# Patient Record
Sex: MALE | Race: WHITE | NOT HISPANIC OR LATINO | Employment: OTHER | URBAN - METROPOLITAN AREA
[De-identification: names, ages, dates, MRNs, and addresses within clinical notes are randomized per-mention and may not be internally consistent; named-entity substitution may affect disease eponyms.]

---

## 2017-02-23 LAB
ALBUMIN SERPL-MCNC: 3.9 G/DL (ref 3.6–5.1)
ALBUMIN/GLOB SERPL: 1.3 (CALC) (ref 1–2.5)
ALP SERPL-CCNC: 117 U/L (ref 40–115)
ALT SERPL-CCNC: 19 U/L (ref 9–46)
AST SERPL-CCNC: 17 U/L (ref 10–35)
BASOPHILS # BLD AUTO: 36 CELLS/UL (ref 0–200)
BASOPHILS NFR BLD AUTO: 0.4 %
BILIRUB SERPL-MCNC: 1.5 MG/DL (ref 0.2–1.2)
BUN SERPL-MCNC: 17 MG/DL (ref 7–25)
BUN/CREAT SERPL: ABNORMAL (CALC) (ref 6–22)
CALCIUM SERPL-MCNC: 9.6 MG/DL (ref 8.6–10.3)
CHLORIDE SERPL-SCNC: 104 MMOL/L (ref 98–110)
CHOLEST SERPL-MCNC: 301 MG/DL (ref 125–200)
CHOLEST/HDLC SERPL: 7 (CALC)
CO2 SERPL-SCNC: 27 MMOL/L (ref 20–31)
CREAT SERPL-MCNC: 1.06 MG/DL (ref 0.7–1.18)
EOSINOPHIL # BLD AUTO: 261 CELLS/UL (ref 15–500)
EOSINOPHIL NFR BLD AUTO: 2.9 %
ERYTHROCYTE [DISTWIDTH] IN BLOOD BY AUTOMATED COUNT: 13.8 % (ref 11–15)
GLOBULIN SER CALC-MCNC: 3 G/DL (CALC) (ref 1.9–3.7)
GLUCOSE SERPL-MCNC: 93 MG/DL (ref 65–99)
HCT VFR BLD AUTO: 47.1 % (ref 38.5–50)
HDLC SERPL-MCNC: 43 MG/DL
HGB BLD-MCNC: 15.7 G/DL (ref 13.2–17.1)
LDLC SERPL CALC-MCNC: 207 MG/DL (CALC)
LYMPHOCYTES # BLD AUTO: 2979 CELLS/UL (ref 850–3900)
LYMPHOCYTES NFR BLD AUTO: 33.1 %
MCH RBC QN AUTO: 33 PG (ref 27–33)
MCHC RBC AUTO-ENTMCNC: 33.4 G/DL (ref 32–36)
MCV RBC AUTO: 98.7 FL (ref 80–100)
MONOCYTES # BLD AUTO: 738 CELLS/UL (ref 200–950)
MONOCYTES NFR BLD AUTO: 8.2 %
NEUTROPHILS # BLD AUTO: 4986 CELLS/UL (ref 1500–7800)
NEUTROPHILS NFR BLD AUTO: 55.4 %
NONHDLC SERPL-MCNC: 258 MG/DL (CALC)
PLATELET # BLD AUTO: 314 THOUSAND/UL (ref 140–400)
PMV BLD REES-ECKER: 8.1 FL (ref 7.5–12.5)
POTASSIUM SERPL-SCNC: 4.4 MMOL/L (ref 3.5–5.3)
PROT SERPL-MCNC: 6.9 G/DL (ref 6.1–8.1)
RBC # BLD AUTO: 4.77 MILLION/UL (ref 4.2–5.8)
SL AMB EGFR AFRICAN AMERICAN: 79 ML/MIN/1.73M2
SL AMB EGFR NON AFRICAN AMERICAN: 68 ML/MIN/1.73M2
SODIUM SERPL-SCNC: 141 MMOL/L (ref 135–146)
TRIGL SERPL-MCNC: 257 MG/DL
WBC # BLD AUTO: 9 THOUSAND/UL (ref 3.8–10.8)

## 2017-03-09 LAB
ALBUMIN SERPL-MCNC: 3.9 G/DL (ref 3.6–5.1)
ALBUMIN/GLOB SERPL: 1.4 (CALC) (ref 1–2.5)
ALP SERPL-CCNC: 114 U/L (ref 40–115)
ALT SERPL-CCNC: 19 U/L (ref 9–46)
AST SERPL-CCNC: 17 U/L (ref 10–35)
BILIRUB DIRECT SERPL-MCNC: 0.2 MG/DL
BILIRUB INDIRECT SERPL-MCNC: 0.7 MG/DL (CALC) (ref 0.2–1.2)
BILIRUB SERPL-MCNC: 0.9 MG/DL (ref 0.2–1.2)
GLOBULIN SER CALC-MCNC: 2.7 G/DL (CALC) (ref 1.9–3.7)
PROT SERPL-MCNC: 6.6 G/DL (ref 6.1–8.1)

## 2017-05-19 LAB
ALBUMIN SERPL-MCNC: 3.9 G/DL (ref 3.6–5.1)
ALBUMIN/GLOB SERPL: 1.4 (CALC) (ref 1–2.5)
ALP SERPL-CCNC: 109 U/L (ref 40–115)
ALT SERPL-CCNC: 14 U/L (ref 9–46)
AST SERPL-CCNC: 17 U/L (ref 10–35)
BILIRUB DIRECT SERPL-MCNC: 0.3 MG/DL
BILIRUB INDIRECT SERPL-MCNC: 1.2 MG/DL (CALC) (ref 0.2–1.2)
BILIRUB SERPL-MCNC: 1.5 MG/DL (ref 0.2–1.2)
CHOLEST SERPL-MCNC: 147 MG/DL (ref 125–200)
CHOLEST/HDLC SERPL: 3.3 (CALC)
GLOBULIN SER CALC-MCNC: 2.8 G/DL (CALC) (ref 1.9–3.7)
HDLC SERPL-MCNC: 45 MG/DL
LDLC SERPL CALC-MCNC: 76 MG/DL (CALC)
NONHDLC SERPL-MCNC: 102 MG/DL (CALC)
PROT SERPL-MCNC: 6.7 G/DL (ref 6.1–8.1)
TRIGL SERPL-MCNC: 129 MG/DL

## 2017-05-28 LAB
ALBUMIN SERPL-MCNC: 3.7 G/DL (ref 3.6–5.1)
ALBUMIN/GLOB SERPL: 1.2 (CALC) (ref 1–2.5)
ALP SERPL-CCNC: 105 U/L (ref 40–115)
ALT SERPL-CCNC: 15 U/L (ref 9–46)
AST SERPL-CCNC: 15 U/L (ref 10–35)
BILIRUB DIRECT SERPL-MCNC: 0.2 MG/DL
BILIRUB INDIRECT SERPL-MCNC: 0.6 MG/DL (CALC) (ref 0.2–1.2)
BILIRUB SERPL-MCNC: 0.8 MG/DL (ref 0.2–1.2)
GLOBULIN SER CALC-MCNC: 3 G/DL (CALC) (ref 1.9–3.7)
PROT SERPL-MCNC: 6.7 G/DL (ref 6.1–8.1)

## 2017-06-04 LAB
BUN SERPL-MCNC: 14 MG/DL (ref 7–25)
BUN/CREAT SERPL: NORMAL (CALC) (ref 6–22)
CALCIUM SERPL-MCNC: 9.2 MG/DL (ref 8.6–10.3)
CHLORIDE SERPL-SCNC: 106 MMOL/L (ref 98–110)
CO2 SERPL-SCNC: 30 MMOL/L (ref 20–31)
CREAT SERPL-MCNC: 1 MG/DL (ref 0.7–1.18)
GLUCOSE SERPL-MCNC: 89 MG/DL (ref 65–99)
POTASSIUM SERPL-SCNC: 5 MMOL/L (ref 3.5–5.3)
SL AMB EGFR AFRICAN AMERICAN: 85 ML/MIN/1.73M2
SL AMB EGFR NON AFRICAN AMERICAN: 73 ML/MIN/1.73M2
SODIUM SERPL-SCNC: 141 MMOL/L (ref 135–146)

## 2017-07-31 ENCOUNTER — ALLSCRIPTS OFFICE VISIT (OUTPATIENT)
Dept: OTHER | Facility: OTHER | Age: 77
End: 2017-07-31

## 2017-07-31 LAB
BILIRUB UR QL STRIP: NORMAL
CLARITY UR: NORMAL
COLOR UR: YELLOW
GLUCOSE (HISTORICAL): NORMAL
HGB UR QL STRIP.AUTO: NORMAL
KETONES UR STRIP-MCNC: NORMAL MG/DL
LEUKOCYTE ESTERASE UR QL STRIP: NORMAL
NITRITE UR QL STRIP: NORMAL
PH UR STRIP.AUTO: 5.5 [PH]
PROT UR STRIP-MCNC: NORMAL MG/DL
SP GR UR STRIP.AUTO: 1.02
UROBILINOGEN UR QL STRIP.AUTO: 0.2

## 2017-09-14 LAB
ALBUMIN SERPL-MCNC: 3.9 G/DL (ref 3.6–5.1)
ALBUMIN/GLOB SERPL: 1.4 (CALC) (ref 1–2.5)
ALP SERPL-CCNC: 105 U/L (ref 40–115)
ALT SERPL-CCNC: 15 U/L (ref 9–46)
AST SERPL-CCNC: 16 U/L (ref 10–35)
BILIRUB DIRECT SERPL-MCNC: 0.2 MG/DL
BILIRUB INDIRECT SERPL-MCNC: 1 MG/DL (CALC) (ref 0.2–1.2)
BILIRUB SERPL-MCNC: 1.2 MG/DL (ref 0.2–1.2)
GLOBULIN SER CALC-MCNC: 2.7 G/DL (CALC) (ref 1.9–3.7)
PROT SERPL-MCNC: 6.6 G/DL (ref 6.1–8.1)

## 2018-01-10 LAB
ALT SERPL-CCNC: 18 U/L (ref 9–46)
AST SERPL-CCNC: 17 U/L (ref 10–35)
CHOLEST SERPL-MCNC: 157 MG/DL
CHOLEST/HDLC SERPL: 3.8 (CALC)
HDLC SERPL-MCNC: 41 MG/DL
LDLC SERPL CALC-MCNC: 90 MG/DL (CALC)
NONHDLC SERPL-MCNC: 116 MG/DL (CALC)
TRIGL SERPL-MCNC: 154 MG/DL

## 2018-01-22 VITALS
SYSTOLIC BLOOD PRESSURE: 120 MMHG | DIASTOLIC BLOOD PRESSURE: 72 MMHG | WEIGHT: 200 LBS | HEIGHT: 70 IN | BODY MASS INDEX: 28.63 KG/M2

## 2018-02-01 RX ORDER — BIMATOPROST 0.01 %
DROPS OPHTHALMIC (EYE)
Refills: 5 | COMMUNITY
Start: 2018-01-02 | End: 2019-11-27 | Stop reason: SDUPTHER

## 2018-02-01 RX ORDER — ROSUVASTATIN CALCIUM 10 MG/1
10 TABLET, COATED ORAL DAILY
Refills: 1 | COMMUNITY
Start: 2018-01-02 | End: 2018-09-24 | Stop reason: SDUPTHER

## 2018-02-01 RX ORDER — SIMVASTATIN 40 MG
TABLET ORAL
COMMUNITY
End: 2018-09-24

## 2018-02-01 RX ORDER — ASPIRIN 81 MG/1
TABLET ORAL
COMMUNITY
End: 2019-11-27 | Stop reason: SDUPTHER

## 2018-02-05 ENCOUNTER — OFFICE VISIT (OUTPATIENT)
Dept: UROLOGY | Facility: MEDICAL CENTER | Age: 78
End: 2018-02-05
Payer: COMMERCIAL

## 2018-02-05 VITALS
HEIGHT: 70 IN | BODY MASS INDEX: 29.78 KG/M2 | DIASTOLIC BLOOD PRESSURE: 74 MMHG | SYSTOLIC BLOOD PRESSURE: 132 MMHG | WEIGHT: 208 LBS

## 2018-02-05 DIAGNOSIS — R97.20 ELEVATED PSA: ICD-10-CM

## 2018-02-05 DIAGNOSIS — N40.1 BPH WITH OBSTRUCTION/LOWER URINARY TRACT SYMPTOMS: Primary | ICD-10-CM

## 2018-02-05 DIAGNOSIS — N13.8 BPH WITH OBSTRUCTION/LOWER URINARY TRACT SYMPTOMS: Primary | ICD-10-CM

## 2018-02-05 LAB
SL AMB  POCT GLUCOSE, UA: NEGATIVE
SL AMB LEUKOCYTE ESTERASE,UA: NEGATIVE
SL AMB POCT BILIRUBIN,UA: NEGATIVE
SL AMB POCT BLOOD,UA: NORMAL
SL AMB POCT CLARITY,UA: CLEAR
SL AMB POCT COLOR,UA: YELLOW
SL AMB POCT KETONES,UA: NEGATIVE
SL AMB POCT NITRITE,UA: NEGATIVE
SL AMB POCT PH,UA: 5.5
SL AMB POCT SPECIFIC GRAVITY,UA: 1.02
SL AMB POCT URINE PROTEIN: NEGATIVE
SL AMB POCT UROBILINOGEN: 0.2

## 2018-02-05 PROCEDURE — 99214 OFFICE O/P EST MOD 30 MIN: CPT | Performed by: UROLOGY

## 2018-02-05 PROCEDURE — 81003 URINALYSIS AUTO W/O SCOPE: CPT | Performed by: UROLOGY

## 2018-02-05 NOTE — ASSESSMENT & PLAN NOTE
Patient's PSA has been relatively stable over time  In fact it was higher in 2011 when he had his  prostate biopsy  The risk of prostate cancer was discussed We discussed options for further evaluation including repeat prostate biopsy and prostate MRI  At the conclusion of our discussion the patient was only in favor of continued observation    We will recheck his PSA in 1 year

## 2018-02-05 NOTE — PROGRESS NOTES
IPSS Questionnaire (AUA-7): Over the past month    1)  How often have you had a sensation of not emptying your bladder completely after you finish urinating? 2 - Less than half the time   2)  How often have you had to urinate again less than two hours after you finished urinating? 1 - Less than 1 time in 5   3)  How often have you found you stopped and started again several times when you urinated? 1 - Less than 1 time in 5   4) How difficult have you found it to postpone urination? 1 - Less than 1 time in 5   5) How often have you had a weak urinary stream?  1 - Less than 1 time in 5   6) How often have you had to push or strain to begin urination? 1 - Less than 1 time in 5   7) How many times did you most typically get up to urinate from the time you went to bed until the time you got up in the morning?   1 - 1 time   Total Score:  8

## 2018-02-05 NOTE — LETTER
February 5, 2018     Ying Fernandez MD  [address] [address]    Patient: Haylee Carlson   YOB: 1940   Date of Visit: 2/5/2018       Dear Dr Akanksha Yi: Thank you for referring Ediscornel Medinamadi to me for evaluation  Below are my notes for this consultation  If you have questions, please do not hesitate to call me  I look forward to following your patient along with you  Sincerely,        Kami Bates MD        CC: No Recipients  Kami Bates MD  2/5/2018  5:55 PM  Sign at close encounter  Assessment/Plan:    BPH with obstruction/lower urinary tract symptoms  Patient is satisfied with his voiding pattern    Elevated PSA  Patient's PSA has been relatively stable over time  In fact it was higher in 2011 when he had his  prostate biopsy  The risk of prostate cancer was discussed We discussed options for further evaluation including repeat prostate biopsy and prostate MRI  At the conclusion of our discussion the patient was only in favor of continued observation  We will recheck his PSA in 1 year       Diagnoses and all orders for this visit:    BPH with obstruction/lower urinary tract symptoms  -     POCT urine dip auto non-scope    Elevated PSA  -     PSA, total and free; Future          Subjective:      Patient ID: Haylee Carlson is a 78 y o  male  31-year-old male followed for elevated PSA and lower urinary tract symptoms secondary to BPH  He notes he is voiding adequately  He is satisfied with his voiding pattern  He denies incontinence, gross hematuria, or history of urinary infection  His stream is adequate  He gets up once a night to urinate  His AUA symptom score is 8  He underwent prostate biopsy in 2011          The following portions of the patient's history were reviewed and updated as appropriate: allergies, current medications, past family history, past medical history, past social history, past surgical history and problem list     Review of Systems   Constitutional: Negative for chills, diaphoresis, fatigue and fever  HENT: Negative  Eyes: Negative  Respiratory: Negative  Cardiovascular: Negative  Endocrine: Negative  Musculoskeletal: Negative  Skin: Negative  Allergic/Immunologic: Negative  Neurological: Negative  Hematological: Negative  Psychiatric/Behavioral: Negative  Objective:     Physical Exam   Constitutional: He is oriented to person, place, and time  He appears well-developed and well-nourished  HENT:   Head: Normocephalic and atraumatic  Eyes: Conjunctivae are normal    Neck: Neck supple  Cardiovascular: Normal rate  Pulmonary/Chest: Effort normal    Abdominal: Soft  Bowel sounds are normal  He exhibits no distension and no mass  There is no tenderness  There is no rebound, no guarding and no CVA tenderness  Hernia confirmed negative in the right inguinal area and confirmed negative in the left inguinal area  No hernia   Genitourinary: Rectum normal, testes normal and penis normal  Prostate is enlarged  Prostate is not tender  Right testis shows no mass  Left testis shows no mass  No phimosis or hypospadias  Genitourinary Comments: Normal phallus, testes descended and palpably normal   Rectal examination:  Normal tone no mass prostate 2 and half times enlarged and palpably benign  Musculoskeletal: He exhibits no edema  Neurological: He is alert and oriented to person, place, and time  Skin: Skin is warm and dry  Psychiatric: He has a normal mood and affect   His behavior is normal  Judgment and thought content normal

## 2018-02-05 NOTE — PATIENT INSTRUCTIONS
Prostate Specific Antigen   AMBULATORY CARE:   A prostate specific antigen (PSA) test  is a blood test used to screen men for prostate cancer  A PSA test is also used to monitor how well prostate cancer treatment is working  Other test that may be done with a PSA test:  A digital rectal exam is usually performed with a PSA test  Your healthcare provider will insert a gloved finger into your rectum to feel if your prostate is large, firm, or has lumps  Who may need a PSA test:  Some experts recommend a PSA test for men ages 48 to 79  They also recommend testing men with a high risk for prostate cancer at age 36 or 39  Risk factors may include being  or having a brother or father with prostate cancer  Other experts may not recommend PSA testing  Your healthcare provider can help you decide if you need a PSA test    What the results of a PSA test mean:  Most healthy men have a PSA level less than 4 ng/mL  If your PSA level is higher than 4 ng/mL you may need more tests  Examples include blood or urine tests, an ultrasound, MRI, CT scan, or a prostate biopsy  Ask your healthcare provider for more information on these tests  Other causes of a high PSA level:  A high PSA level does not always mean you have prostate cancer  Certain conditions or procedures can increase PSA levels  Examples include:  · Older age    · Procedures such as a prostate biopsy or a cystoscopy    · An enlarged prostate    · Recent sexual activity    · A prostate infection    · Certain exercises that put pressure on the prostate such as bicycling    · Medicine such as testosterone  © 2017 [employer] Information is for End User's use only and may not be sold, redistributed or otherwise used for commercial purposes  All illustrations and images included in CareNotes® are the copyrighted property of [employer]  or Timothy Frausto  The above information is an  only   It is not intended as medical advice for individual conditions or treatments  Talk to your doctor, nurse or pharmacist before following any medical regimen to see if it is safe and effective for you

## 2018-02-05 NOTE — PROGRESS NOTES
Assessment/Plan:    BPH with obstruction/lower urinary tract symptoms  Patient is satisfied with his voiding pattern    Elevated PSA  Patient's PSA has been relatively stable over time  In fact it was higher in 2011 when he had his  prostate biopsy  The risk of prostate cancer was discussed We discussed options for further evaluation including repeat prostate biopsy and prostate MRI  At the conclusion of our discussion the patient was only in favor of continued observation  We will recheck his PSA in 1 year       Diagnoses and all orders for this visit:    BPH with obstruction/lower urinary tract symptoms  -     POCT urine dip auto non-scope    Elevated PSA  -     PSA, total and free; Future          Subjective:      Patient ID: Janina Ribeiro is a 78 y o  male  79-year-old male followed for elevated PSA and lower urinary tract symptoms secondary to BPH  He notes he is voiding adequately  He is satisfied with his voiding pattern  He denies incontinence, gross hematuria, or history of urinary infection  His stream is adequate  He gets up once a night to urinate  His AUA symptom score is 8  He underwent prostate biopsy in 2011  The following portions of the patient's history were reviewed and updated as appropriate: allergies, current medications, past family history, past medical history, past social history, past surgical history and problem list     Review of Systems   Constitutional: Negative for chills, diaphoresis, fatigue and fever  HENT: Negative  Eyes: Negative  Respiratory: Negative  Cardiovascular: Negative  Endocrine: Negative  Musculoskeletal: Negative  Skin: Negative  Allergic/Immunologic: Negative  Neurological: Negative  Hematological: Negative  Psychiatric/Behavioral: Negative  Objective:     Physical Exam   Constitutional: He is oriented to person, place, and time  He appears well-developed and well-nourished     HENT:   Head: Normocephalic and atraumatic  Eyes: Conjunctivae are normal    Neck: Neck supple  Cardiovascular: Normal rate  Pulmonary/Chest: Effort normal    Abdominal: Soft  Bowel sounds are normal  He exhibits no distension and no mass  There is no tenderness  There is no rebound, no guarding and no CVA tenderness  Hernia confirmed negative in the right inguinal area and confirmed negative in the left inguinal area  No hernia   Genitourinary: Rectum normal, testes normal and penis normal  Prostate is enlarged  Prostate is not tender  Right testis shows no mass  Left testis shows no mass  No phimosis or hypospadias  Genitourinary Comments: Normal phallus, testes descended and palpably normal   Rectal examination:  Normal tone no mass prostate 2 and half times enlarged and palpably benign  Musculoskeletal: He exhibits no edema  Neurological: He is alert and oriented to person, place, and time  Skin: Skin is warm and dry  Psychiatric: He has a normal mood and affect   His behavior is normal  Judgment and thought content normal

## 2018-05-09 LAB
ALBUMIN SERPL-MCNC: 4 G/DL (ref 3.6–5.1)
ALBUMIN/GLOB SERPL: 1.4 (CALC) (ref 1–2.5)
ALP SERPL-CCNC: 81 U/L (ref 40–115)
ALT SERPL-CCNC: 16 U/L (ref 9–46)
AST SERPL-CCNC: 18 U/L (ref 10–35)
BASOPHILS # BLD AUTO: 57 CELLS/UL (ref 0–200)
BASOPHILS NFR BLD AUTO: 0.8 %
BILIRUB SERPL-MCNC: 1.4 MG/DL (ref 0.2–1.2)
BUN SERPL-MCNC: 15 MG/DL (ref 7–25)
BUN/CREAT SERPL: ABNORMAL (CALC) (ref 6–22)
CALCIUM SERPL-MCNC: 9.7 MG/DL (ref 8.6–10.3)
CHLORIDE SERPL-SCNC: 107 MMOL/L (ref 98–110)
CO2 SERPL-SCNC: 28 MMOL/L (ref 20–31)
CREAT SERPL-MCNC: 0.98 MG/DL (ref 0.7–1.18)
EOSINOPHIL # BLD AUTO: 348 CELLS/UL (ref 15–500)
EOSINOPHIL NFR BLD AUTO: 4.9 %
ERYTHROCYTE [DISTWIDTH] IN BLOOD BY AUTOMATED COUNT: 12.6 % (ref 11–15)
GLOBULIN SER CALC-MCNC: 2.8 G/DL (CALC) (ref 1.9–3.7)
GLUCOSE SERPL-MCNC: 95 MG/DL (ref 65–99)
HCT VFR BLD AUTO: 45.3 % (ref 38.5–50)
HGB BLD-MCNC: 15.2 G/DL (ref 13.2–17.1)
LYMPHOCYTES # BLD AUTO: 2166 CELLS/UL (ref 850–3900)
LYMPHOCYTES NFR BLD AUTO: 30.5 %
MCH RBC QN AUTO: 32.5 PG (ref 27–33)
MCHC RBC AUTO-ENTMCNC: 33.6 G/DL (ref 32–36)
MCV RBC AUTO: 97 FL (ref 80–100)
MONOCYTES # BLD AUTO: 582 CELLS/UL (ref 200–950)
MONOCYTES NFR BLD AUTO: 8.2 %
NEUTROPHILS # BLD AUTO: 3948 CELLS/UL (ref 1500–7800)
NEUTROPHILS NFR BLD AUTO: 55.6 %
PLATELET # BLD AUTO: 282 THOUSAND/UL (ref 140–400)
PMV BLD REES-ECKER: 10.2 FL (ref 7.5–12.5)
POTASSIUM SERPL-SCNC: 4.3 MMOL/L (ref 3.5–5.3)
PROT SERPL-MCNC: 6.8 G/DL (ref 6.1–8.1)
RBC # BLD AUTO: 4.67 MILLION/UL (ref 4.2–5.8)
SL AMB EGFR AFRICAN AMERICAN: 86 ML/MIN/1.73M2
SL AMB EGFR NON AFRICAN AMERICAN: 75 ML/MIN/1.73M2
SODIUM SERPL-SCNC: 140 MMOL/L (ref 135–146)
WBC # BLD AUTO: 7.1 THOUSAND/UL (ref 3.8–10.8)

## 2018-09-24 DIAGNOSIS — E78.2 MIXED HYPERLIPIDEMIA: Primary | ICD-10-CM

## 2018-09-24 RX ORDER — ROSUVASTATIN CALCIUM 10 MG/1
10 TABLET, COATED ORAL DAILY
Qty: 90 TABLET | Refills: 3 | Status: SHIPPED | OUTPATIENT
Start: 2018-09-24 | End: 2019-09-16 | Stop reason: SDUPTHER

## 2018-10-09 LAB
ALBUMIN SERPL-MCNC: 3.9 G/DL (ref 3.6–5.1)
ALBUMIN/GLOB SERPL: 1.4 (CALC) (ref 1–2.5)
ALP SERPL-CCNC: 72 U/L (ref 40–115)
ALT SERPL-CCNC: 14 U/L (ref 9–46)
AST SERPL-CCNC: 14 U/L (ref 10–35)
BILIRUB DIRECT SERPL-MCNC: 0.2 MG/DL
BILIRUB INDIRECT SERPL-MCNC: 1 MG/DL (CALC) (ref 0.2–1.2)
BILIRUB SERPL-MCNC: 1.2 MG/DL (ref 0.2–1.2)
CHOLEST SERPL-MCNC: 153 MG/DL
CHOLEST/HDLC SERPL: 3.5 (CALC)
GLOBULIN SER CALC-MCNC: 2.8 G/DL (CALC) (ref 1.9–3.7)
HDLC SERPL-MCNC: 44 MG/DL
LDLC SERPL CALC-MCNC: 86 MG/DL (CALC)
NONHDLC SERPL-MCNC: 109 MG/DL (CALC)
PROT SERPL-MCNC: 6.7 G/DL (ref 6.1–8.1)
TRIGL SERPL-MCNC: 132 MG/DL

## 2018-10-16 ENCOUNTER — OFFICE VISIT (OUTPATIENT)
Dept: FAMILY MEDICINE CLINIC | Facility: CLINIC | Age: 78
End: 2018-10-16
Payer: COMMERCIAL

## 2018-10-16 VITALS
WEIGHT: 211.2 LBS | SYSTOLIC BLOOD PRESSURE: 118 MMHG | HEIGHT: 69 IN | HEART RATE: 86 BPM | OXYGEN SATURATION: 95 % | RESPIRATION RATE: 20 BRPM | TEMPERATURE: 96.9 F | BODY MASS INDEX: 31.28 KG/M2 | DIASTOLIC BLOOD PRESSURE: 60 MMHG

## 2018-10-16 DIAGNOSIS — D75.89 MACROCYTOSIS: ICD-10-CM

## 2018-10-16 DIAGNOSIS — R79.89 ABNORMAL LFTS (LIVER FUNCTION TESTS): ICD-10-CM

## 2018-10-16 DIAGNOSIS — R03.0 BLOOD PRESSURE ELEVATED WITHOUT HISTORY OF HTN: ICD-10-CM

## 2018-10-16 DIAGNOSIS — N13.8 BPH WITH OBSTRUCTION/LOWER URINARY TRACT SYMPTOMS: ICD-10-CM

## 2018-10-16 DIAGNOSIS — R97.20 ELEVATED PSA: ICD-10-CM

## 2018-10-16 DIAGNOSIS — E78.00 PURE HYPERCHOLESTEROLEMIA: Primary | ICD-10-CM

## 2018-10-16 DIAGNOSIS — Z28.21 IMMUNIZATION NOT CARRIED OUT BECAUSE OF PATIENT REFUSAL: ICD-10-CM

## 2018-10-16 DIAGNOSIS — N40.1 BPH WITH OBSTRUCTION/LOWER URINARY TRACT SYMPTOMS: ICD-10-CM

## 2018-10-16 PROCEDURE — 99214 OFFICE O/P EST MOD 30 MIN: CPT | Performed by: FAMILY MEDICINE

## 2018-10-16 PROCEDURE — 3008F BODY MASS INDEX DOCD: CPT | Performed by: FAMILY MEDICINE

## 2018-10-16 PROCEDURE — 1160F RVW MEDS BY RX/DR IN RCRD: CPT | Performed by: FAMILY MEDICINE

## 2018-10-16 NOTE — PATIENT INSTRUCTIONS
Recommend colonoscopy/ patient stated will do it next  Time   to follow up with his Test result results

## 2018-10-18 NOTE — PROGRESS NOTES
Assessment/Plan:      Diagnoses and all orders for this visit:    Pure hypercholesterolemia  Comments:  Controlled  To follow with low-fat diet    Abnormal LFTs (liver function tests)  Comments:   corrected    Blood pressure elevated without history of HTN  Comments:   today blood pressure is normal    Macrocytosis  -     CBC and differential; Future    BPH with obstruction/lower urinary tract symptoms  Comments: To follow with Urology    Elevated PSA  Comments: To follow with Urology    Immunization not carried out because of patient refusal  Comments:  Flu shot, Prevnar, and vaccine for shingles    Other orders  -     Brimonidine Tartrate-Timolol (COMBIGAN OP); Apply to eye          Subjective:     Patient ID: Ricky Burch is a 70 y o  male  Patient is here for follow-up on his chronic medical problems  Hyperlipidemia  Denied side effect with the statin  Admit to regular fat intake  Denied chest pain or skin lesion secondary to hyperlipidemia  Patient see Ophthalmology every 6 months  Macrocytosis  Patient denied drinking alcohol  Not following with Dr Vero Silverman  Abnormal liver function test   Denied abdominal pain  Obesity  Patient does not watch his diet  Or he does exercise  Denied fatigue  Enlarged prostate  Patient denied problem with urination  He does follow with Urology    Test results  Labs done on 10/ 08/2018 discussed with patient        Review of Systems   Constitutional: Negative for activity change, appetite change, chills, fatigue, fever and unexpected weight change  HENT: Negative for congestion, ear pain, sinus pressure, sore throat, trouble swallowing and voice change  Eyes: Negative for visual disturbance  Respiratory: Negative for cough, chest tightness, shortness of breath and wheezing  Cardiovascular: Negative for chest pain, palpitations and leg swelling  Gastrointestinal: Negative for abdominal pain, blood in stool, constipation, diarrhea, nausea and vomiting  Endocrine: Negative for polydipsia and polyphagia  Genitourinary: Negative for dysuria, frequency, hematuria and urgency  Musculoskeletal: Negative for arthralgias, back pain, gait problem, joint swelling, myalgias and neck pain  Skin: Negative for rash  Neurological: Negative for dizziness, tremors, seizures, syncope, weakness, light-headedness and headaches  Hematological: Negative for adenopathy  Does not bruise/bleed easily  Psychiatric/Behavioral: Negative for behavioral problems, confusion, dysphoric mood and sleep disturbance  The patient is not nervous/anxious  Objective:     Physical Exam   Constitutional: He is oriented to person, place, and time  He appears well-developed and well-nourished  No distress  HENT:   Head: Normocephalic  Eyes: No scleral icterus  Neck: Neck supple  No JVD present  No thyromegaly present  Cardiovascular: Normal rate and regular rhythm  No murmur heard  Pulses:       Carotid pulses are 3+ on the right side, and 3+ on the left side  Dorsalis pedis pulses are 3+ on the right side, and 3+ on the left side  Pulmonary/Chest: Effort normal and breath sounds normal    Abdominal: Soft  Bowel sounds are normal  He exhibits no distension and no mass  There is no tenderness  There is no rebound and no guarding  Musculoskeletal: He exhibits no edema or tenderness  Lymphadenopathy:     He has no cervical adenopathy  Neurological: He is alert and oriented to person, place, and time  No cranial nerve deficit  He exhibits normal muscle tone  Skin: No rash noted  No erythema  No pallor  Psychiatric: He has a normal mood and affect   His behavior is normal  Judgment and thought content normal

## 2018-10-23 ENCOUNTER — CLINICAL SUPPORT (OUTPATIENT)
Dept: FAMILY MEDICINE CLINIC | Facility: CLINIC | Age: 78
End: 2018-10-23
Payer: COMMERCIAL

## 2018-10-23 VITALS — TEMPERATURE: 97 F

## 2018-10-23 DIAGNOSIS — Z23 NEED FOR IMMUNIZATION AGAINST INFLUENZA: Primary | ICD-10-CM

## 2018-10-23 PROCEDURE — G0008 ADMIN INFLUENZA VIRUS VAC: HCPCS | Performed by: FAMILY MEDICINE

## 2018-10-23 PROCEDURE — 90662 IIV NO PRSV INCREASED AG IM: CPT | Performed by: FAMILY MEDICINE

## 2019-02-05 LAB
BASOPHILS # BLD AUTO: 60 CELLS/UL (ref 0–200)
BASOPHILS NFR BLD AUTO: 0.7 %
EOSINOPHIL # BLD AUTO: 391 CELLS/UL (ref 15–500)
EOSINOPHIL NFR BLD AUTO: 4.6 %
ERYTHROCYTE [DISTWIDTH] IN BLOOD BY AUTOMATED COUNT: 12.7 % (ref 11–15)
HCT VFR BLD AUTO: 44.9 % (ref 38.5–50)
HGB BLD-MCNC: 15.3 G/DL (ref 13.2–17.1)
LYMPHOCYTES # BLD AUTO: 2278 CELLS/UL (ref 850–3900)
LYMPHOCYTES NFR BLD AUTO: 26.8 %
MCH RBC QN AUTO: 33.1 PG (ref 27–33)
MCHC RBC AUTO-ENTMCNC: 34.1 G/DL (ref 32–36)
MCV RBC AUTO: 97.2 FL (ref 80–100)
MONOCYTES # BLD AUTO: 740 CELLS/UL (ref 200–950)
MONOCYTES NFR BLD AUTO: 8.7 %
NEUTROPHILS # BLD AUTO: 5032 CELLS/UL (ref 1500–7800)
NEUTROPHILS NFR BLD AUTO: 59.2 %
PLATELET # BLD AUTO: 294 THOUSAND/UL (ref 140–400)
PMV BLD REES-ECKER: 10.1 FL (ref 7.5–12.5)
RBC # BLD AUTO: 4.62 MILLION/UL (ref 4.2–5.8)
WBC # BLD AUTO: 8.5 THOUSAND/UL (ref 3.8–10.8)

## 2019-02-12 ENCOUNTER — OFFICE VISIT (OUTPATIENT)
Dept: UROLOGY | Facility: MEDICAL CENTER | Age: 79
End: 2019-02-12
Payer: COMMERCIAL

## 2019-02-12 VITALS
HEART RATE: 62 BPM | BODY MASS INDEX: 30.51 KG/M2 | SYSTOLIC BLOOD PRESSURE: 138 MMHG | DIASTOLIC BLOOD PRESSURE: 70 MMHG | HEIGHT: 69 IN | WEIGHT: 206 LBS

## 2019-02-12 DIAGNOSIS — N13.8 BPH WITH OBSTRUCTION/LOWER URINARY TRACT SYMPTOMS: ICD-10-CM

## 2019-02-12 DIAGNOSIS — N52.03 COMBINED ARTERIAL INSUFFICIENCY AND CORPORO-VENOUS OCCLUSIVE ERECTILE DYSFUNCTION: ICD-10-CM

## 2019-02-12 DIAGNOSIS — R31.29 MICROSCOPIC HEMATURIA: Primary | ICD-10-CM

## 2019-02-12 DIAGNOSIS — N40.1 BPH WITH OBSTRUCTION/LOWER URINARY TRACT SYMPTOMS: ICD-10-CM

## 2019-02-12 DIAGNOSIS — R97.20 ELEVATED PSA: ICD-10-CM

## 2019-02-12 LAB
SL AMB  POCT GLUCOSE, UA: ABNORMAL
SL AMB LEUKOCYTE ESTERASE,UA: ABNORMAL
SL AMB POCT BILIRUBIN,UA: ABNORMAL
SL AMB POCT BLOOD,UA: ABNORMAL
SL AMB POCT CLARITY,UA: CLEAR
SL AMB POCT COLOR,UA: YELLOW
SL AMB POCT KETONES,UA: ABNORMAL
SL AMB POCT NITRITE,UA: ABNORMAL
SL AMB POCT PH,UA: 6
SL AMB POCT SPECIFIC GRAVITY,UA: <=1.005
SL AMB POCT URINE PROTEIN: ABNORMAL
SL AMB POCT UROBILINOGEN: 0.2

## 2019-02-12 PROCEDURE — 81003 URINALYSIS AUTO W/O SCOPE: CPT | Performed by: UROLOGY

## 2019-02-12 PROCEDURE — 99214 OFFICE O/P EST MOD 30 MIN: CPT | Performed by: UROLOGY

## 2019-02-12 RX ORDER — SILDENAFIL 100 MG/1
100 TABLET, FILM COATED ORAL DAILY PRN
Qty: 30 TABLET | Refills: 1 | Status: SHIPPED | OUTPATIENT
Start: 2019-02-12

## 2019-02-12 NOTE — ASSESSMENT & PLAN NOTE
AUA symptom score is 16  He is satisfied with his voiding pattern  We will continue to follow with watchful waiting

## 2019-02-12 NOTE — LETTER
February 12, 2019     Apurva Patel MD  [address]    Patient: Sarai Stanley   YOB: 1940   Date of Visit: 2/12/2019       Dear Dr Farida Pinedo: Thank you for referring Luca Paige to me for evaluation  Below are my notes for this consultation  If you have questions, please do not hesitate to call me  I look forward to following your patient along with you  Sincerely,        Sonny Camacho MD        CC: No Recipients  Sonny Camacho MD  2/12/2019  2:56 PM  Sign at close encounter  Assessment/Plan:    Elevated PSA  PSA was 5 7 on February 5, 2019  Free PSA is 19%  Last year his PSA was 7 8  His digital rectal examination is benign in nature  He has a long history of PSA elevation and did have a biopsy in the past   Options were discussed including MRI and we will continue to follow  We will plan to recheck a PSA in 1 year    BPH with obstruction/lower urinary tract symptoms  AUA symptom score is 16  He is satisfied with his voiding pattern  We will continue to follow with watchful waiting  Microscopic hematuria  Trace blood is noted on dipstick  We will continue to follow and plan to recheck his urinalysis in 1 year  Diagnoses and all orders for this visit:    Microscopic hematuria  -     POCT urine dip auto non-scope  -     Urinalysis with microscopic; Future    BPH with obstruction/lower urinary tract symptoms  -     PSA, total and free; Future    Elevated PSA  -     PSA, total and free; Future    Combined arterial insufficiency and corporo-venous occlusive erectile dysfunction  -     sildenafil (VIAGRA) 100 mg tablet; Take 1 tablet (100 mg total) by mouth daily as needed for erectile dysfunction          Subjective:      Patient ID: Sarai Stanley is a 70 y o  male  Benign Prostatic Hypertrophy   This is a chronic problem  The current episode started more than 1 year ago  The problem is unchanged  Irritative symptoms include nocturia  Irritative symptoms do not include frequency or urgency  Obstructive symptoms include a slower stream  Obstructive symptoms do not include dribbling, incomplete emptying, an intermittent stream, straining or a weak stream  Pertinent negatives include no chills, dysuria, genital pain, hematuria, hesitancy, nausea or vomiting  AUA score is 8-19  His sexual activity is non-contributory to the current illness  Nothing aggravates the symptoms  Past treatments include nothing (He is satisfied with his voiding pattern  )  Erectile Dysfunction   This is a new problem  The current episode started more than 1 month ago  The problem has been gradually worsening since onset  The nature of his difficulty is maintaining erection  Irritative symptoms include nocturia  Irritative symptoms do not include frequency or urgency  Obstructive symptoms include a slower stream  Obstructive symptoms do not include dribbling, incomplete emptying, an intermittent stream, straining or a weak stream  Pertinent negatives include no chills, dysuria, genital pain, hematuria or hesitancy  Nothing aggravates the symptoms  Past treatments include nothing  The following portions of the patient's history were reviewed and updated as appropriate: allergies, current medications, past family history, past medical history, past social history, past surgical history and problem list     Review of Systems   Constitutional: Negative for chills, diaphoresis, fatigue and fever  HENT: Negative  Eyes: Negative  Respiratory: Negative  Cardiovascular: Negative  Gastrointestinal: Negative  Negative for nausea and vomiting  Endocrine: Negative  Genitourinary: Positive for nocturia  Negative for dysuria, frequency, hematuria, hesitancy, incomplete emptying and urgency  See HPI   Musculoskeletal: Negative  Skin: Negative  Allergic/Immunologic: Negative  Neurological: Negative  Hematological: Negative  Psychiatric/Behavioral: Negative  AUA SYMPTOM SCORE      Most Recent Value   AUA SYMPTOM SCORE   How often have you had a sensation of not emptying your bladder completely after you finished urinating? 3   How often have you had to urinate again less than two hours after you finished urinating? 3   How often have you found you stopped and started again several times when you urinate? 2   How often have you found it difficult to postpone urination? 2   How often have you had a weak urinary stream?  2   How often have you had to push or strain to begin urination? 2   How many times did you most typically get up to urinate from the time you went to bed at night until the time you got up in the morning? 2   Quality of Life: If you were to spend the rest of your life with your urinary condition just the way it is now, how would you feel about that?  2   AUA SYMPTOM SCORE  16        Objective:      /70 (BP Location: Left arm, Patient Position: Sitting, Cuff Size: Adult)   Pulse 62   Ht 5' 9" (1 753 m)   Wt 93 4 kg (206 lb)   BMI 30 42 kg/m²           Physical Exam   Constitutional: He is oriented to person, place, and time  He appears well-developed and well-nourished  HENT:   Head: Normocephalic and atraumatic  Eyes: Conjunctivae are normal    Neck: Neck supple  Cardiovascular: Normal rate  Pulmonary/Chest: Effort normal    Abdominal: Soft  Bowel sounds are normal  He exhibits no distension and no mass  There is no tenderness  There is no rebound, no guarding and no CVA tenderness  Genitourinary: Rectum normal, testes normal and penis normal  Right testis shows no mass  Left testis shows no mass  No phimosis or hypospadias  Genitourinary Comments: Prostate 2 5 X enlarged and palpably benign   Musculoskeletal: He exhibits no edema  Neurological: He is alert and oriented to person, place, and time  Skin: Skin is warm and dry  Psychiatric: He has a normal mood and affect   His behavior is normal  Judgment and thought content normal

## 2019-02-12 NOTE — PATIENT INSTRUCTIONS
Sildenafil (By mouth)   Sildenafil ([id number]-ascencion)  Treats erectile dysfunction  Also treats pulmonary arterial hypertension (high blood pressure in the lungs)  Brand Name(s): Revatio Viagra   There may be other brand names for this medicine  When This Medicine Should Not Be Used: This medicine is not right for everyone  Do not use it if you had an allergic reaction to sildenafil  How to Use This Medicine:   Tablet, Liquid  · Your doctor will tell you how much medicine to use  Do not use more than directed  · For erectile dysfunction: Take this medicine about 1 hour before you have sex  Do not take it more than once a day  Always allow at least 24 hours between doses  · For pulmonary arterial hypertension:   ¨ Take this medicine 3 times a day, 4 to 6 hours apart  ¨ If you miss a dose, take it as soon as you remember  If it is almost time for your next dose, wait until then and take a regular dose  Do not take extra medicine to make up for a missed dose  · Oral liquid: Shake the bottle well for at least 10 seconds  Use the oral syringe provided in the package to measure each dose  Wash the syringe after each use  · Read and follow the patient instructions that come with this medicine  Talk to your doctor or pharmacist if you have any questions  · Store the medicine in a closed container at room temperature, away from heat, moisture, and direct light  · Throw away any unused mixed oral liquid after 60 days  Drugs and Foods to Avoid:   Ask your doctor or pharmacist before using any other medicine, including over-the-counter medicines, vitamins, and herbal products  · Do not use this medicine if you also use riociguat or a nitrate medicine  Do not take other medicines that contain sildenafil or similar medicines, such as tadalafil or vardenafil  · Some medicines can affect how sildenafil works   Tell your doctor if you are using any of the following:   ¨ Amlodipine, atazanavir, bosentan, cimetidine, erythromycin, indinavir, itraconazole, ketoconazole, rifampin, ritonavir, saquinavir  ¨ Medicine for prostate problems or high blood pressure (including alfuzosin, doxazosin, prazosin, silodosin, tamsulosin, terazosin)  Warnings While Using This Medicine:   · Tell your doctor if you are pregnant or breastfeeding, or if you have kidney disease, liver disease, pulmonary veno-occlusive disease, diabetes, bleeding problems, leukemia, multiple myeloma, sickle cell anemia, a stomach ulcer, or eye problems  Tell your doctor if you have angina or chest pain during sex, heart disease, heart rhythm problems, high or low blood pressure, or a history of heart attack or stroke  Also tell your doctor if you smoke  · Tell any doctor who treats you that you take sildenafil  · This medicine may cause the following problems:   ¨ Low blood pressure (especially if taken with other medicines that lower blood pressure)  ¨ Heart problems  ¨ Painful or prolonged erection  ¨ Vision or hearing problems  · Keep all medicine out of the reach of children  Never share your medicine with anyone    Possible Side Effects While Using This Medicine:   Call your doctor right away if you notice any of these side effects:  · Allergic reaction: Itching or hives, swelling in your face or hands, swelling or tingling in your mouth or throat, chest tightness, trouble breathing  · Chest pain, trouble breathing, sudden or severe headache  · Fast, slow, pounding, or uneven heartbeat  · Lightheadedness, fainting  · Painful erection or an erection that lasts longer than 4 hours  · Sudden loss of vision  · Sudden decrease in hearing or hearing loss, ringing in the ears, dizziness  If you notice these less serious side effects, talk with your doctor:   · Headache  · Nosebleeds  · Stuffy or runny nose  · Upset stomach  · Warmth or redness in your face, neck, arms, or upper chest  If you notice other side effects that you think are caused by this medicine, tell your doctor  Call your doctor for medical advice about side effects  You may report side effects to FDA at 3-058-FDA-8779  © 2017 [employer] Information is for End User's use only and may not be sold, redistributed or otherwise used for commercial purposes  The above information is an  only  It is not intended as medical advice for individual conditions or treatments  Talk to your doctor, nurse or pharmacist before following any medical regimen to see if it is safe and effective for you

## 2019-02-12 NOTE — ASSESSMENT & PLAN NOTE
Trace blood is noted on dipstick  We will continue to follow and plan to recheck his urinalysis in 1 year

## 2019-02-12 NOTE — ASSESSMENT & PLAN NOTE
PSA was 5 7 on February 5, 2019  Free PSA is 19%  Last year his PSA was 7 8  His digital rectal examination is benign in nature  He has a long history of PSA elevation and did have a biopsy in the past   Options were discussed including MRI and we will continue to follow    We will plan to recheck a PSA in 1 year

## 2019-02-12 NOTE — PROGRESS NOTES
Assessment/Plan:    Elevated PSA  PSA was 5 7 on February 5, 2019  Free PSA is 19%  Last year his PSA was 7 8  His digital rectal examination is benign in nature  He has a long history of PSA elevation and did have a biopsy in the past   Options were discussed including MRI and we will continue to follow  We will plan to recheck a PSA in 1 year    BPH with obstruction/lower urinary tract symptoms  AUA symptom score is 16  He is satisfied with his voiding pattern  We will continue to follow with watchful waiting  Microscopic hematuria  Trace blood is noted on dipstick  We will continue to follow and plan to recheck his urinalysis in 1 year  Diagnoses and all orders for this visit:    Microscopic hematuria  -     POCT urine dip auto non-scope  -     Urinalysis with microscopic; Future    BPH with obstruction/lower urinary tract symptoms  -     PSA, total and free; Future    Elevated PSA  -     PSA, total and free; Future    Combined arterial insufficiency and corporo-venous occlusive erectile dysfunction  -     sildenafil (VIAGRA) 100 mg tablet; Take 1 tablet (100 mg total) by mouth daily as needed for erectile dysfunction          Subjective:      Patient ID: Horace Wilkins is a 70 y o  male  Benign Prostatic Hypertrophy   This is a chronic problem  The current episode started more than 1 year ago  The problem is unchanged  Irritative symptoms include nocturia  Irritative symptoms do not include frequency or urgency  Obstructive symptoms include a slower stream  Obstructive symptoms do not include dribbling, incomplete emptying, an intermittent stream, straining or a weak stream  Pertinent negatives include no chills, dysuria, genital pain, hematuria, hesitancy, nausea or vomiting  AUA score is 8-19  His sexual activity is non-contributory to the current illness  Nothing aggravates the symptoms  Past treatments include nothing (He is satisfied with his voiding pattern  )     Erectile Dysfunction This is a new problem  The current episode started more than 1 month ago  The problem has been gradually worsening since onset  The nature of his difficulty is maintaining erection  Irritative symptoms include nocturia  Irritative symptoms do not include frequency or urgency  Obstructive symptoms include a slower stream  Obstructive symptoms do not include dribbling, incomplete emptying, an intermittent stream, straining or a weak stream  Pertinent negatives include no chills, dysuria, genital pain, hematuria or hesitancy  Nothing aggravates the symptoms  Past treatments include nothing  The following portions of the patient's history were reviewed and updated as appropriate: allergies, current medications, past family history, past medical history, past social history, past surgical history and problem list     Review of Systems   Constitutional: Negative for chills, diaphoresis, fatigue and fever  HENT: Negative  Eyes: Negative  Respiratory: Negative  Cardiovascular: Negative  Gastrointestinal: Negative  Negative for nausea and vomiting  Endocrine: Negative  Genitourinary: Positive for nocturia  Negative for dysuria, frequency, hematuria, hesitancy, incomplete emptying and urgency  See HPI   Musculoskeletal: Negative  Skin: Negative  Allergic/Immunologic: Negative  Neurological: Negative  Hematological: Negative  Psychiatric/Behavioral: Negative  AUA SYMPTOM SCORE      Most Recent Value   AUA SYMPTOM SCORE   How often have you had a sensation of not emptying your bladder completely after you finished urinating? 3   How often have you had to urinate again less than two hours after you finished urinating? 3   How often have you found you stopped and started again several times when you urinate? 2   How often have you found it difficult to postpone urination?   2   How often have you had a weak urinary stream?  2   How often have you had to push or strain to begin urination? 2   How many times did you most typically get up to urinate from the time you went to bed at night until the time you got up in the morning? 2   Quality of Life: If you were to spend the rest of your life with your urinary condition just the way it is now, how would you feel about that?  2   AUA SYMPTOM SCORE  16        Objective:      /70 (BP Location: Left arm, Patient Position: Sitting, Cuff Size: Adult)   Pulse 62   Ht 5' 9" (1 753 m)   Wt 93 4 kg (206 lb)   BMI 30 42 kg/m²          Physical Exam   Constitutional: He is oriented to person, place, and time  He appears well-developed and well-nourished  HENT:   Head: Normocephalic and atraumatic  Eyes: Conjunctivae are normal    Neck: Neck supple  Cardiovascular: Normal rate  Pulmonary/Chest: Effort normal    Abdominal: Soft  Bowel sounds are normal  He exhibits no distension and no mass  There is no tenderness  There is no rebound, no guarding and no CVA tenderness  Genitourinary: Rectum normal, testes normal and penis normal  Right testis shows no mass  Left testis shows no mass  No phimosis or hypospadias  Genitourinary Comments: Prostate 2 5 X enlarged and palpably benign   Musculoskeletal: He exhibits no edema  Neurological: He is alert and oriented to person, place, and time  Skin: Skin is warm and dry  Psychiatric: He has a normal mood and affect   His behavior is normal  Judgment and thought content normal

## 2019-02-25 ENCOUNTER — OFFICE VISIT (OUTPATIENT)
Dept: FAMILY MEDICINE CLINIC | Facility: CLINIC | Age: 79
End: 2019-02-25
Payer: COMMERCIAL

## 2019-02-25 VITALS
WEIGHT: 211 LBS | BODY MASS INDEX: 31.16 KG/M2 | DIASTOLIC BLOOD PRESSURE: 66 MMHG | OXYGEN SATURATION: 97 % | RESPIRATION RATE: 20 BRPM | SYSTOLIC BLOOD PRESSURE: 100 MMHG | HEART RATE: 63 BPM | TEMPERATURE: 97.1 F

## 2019-02-25 DIAGNOSIS — R03.0 BLOOD PRESSURE ELEVATED WITHOUT HISTORY OF HTN: ICD-10-CM

## 2019-02-25 DIAGNOSIS — N40.1 BPH WITH OBSTRUCTION/LOWER URINARY TRACT SYMPTOMS: ICD-10-CM

## 2019-02-25 DIAGNOSIS — E78.00 PURE HYPERCHOLESTEROLEMIA: ICD-10-CM

## 2019-02-25 DIAGNOSIS — D18.03 LIVER HEMANGIOMA: ICD-10-CM

## 2019-02-25 DIAGNOSIS — Z00.00 MEDICARE ANNUAL WELLNESS VISIT, INITIAL: Primary | ICD-10-CM

## 2019-02-25 DIAGNOSIS — D75.89 MACROCYTOSIS: ICD-10-CM

## 2019-02-25 DIAGNOSIS — N13.8 BPH WITH OBSTRUCTION/LOWER URINARY TRACT SYMPTOMS: ICD-10-CM

## 2019-02-25 DIAGNOSIS — R97.20 ELEVATED PSA: ICD-10-CM

## 2019-02-25 PROCEDURE — 1170F FXNL STATUS ASSESSED: CPT | Performed by: FAMILY MEDICINE

## 2019-02-25 PROCEDURE — G0438 PPPS, INITIAL VISIT: HCPCS | Performed by: FAMILY MEDICINE

## 2019-02-25 PROCEDURE — 1125F AMNT PAIN NOTED PAIN PRSNT: CPT | Performed by: FAMILY MEDICINE

## 2019-02-25 PROCEDURE — 99214 OFFICE O/P EST MOD 30 MIN: CPT | Performed by: FAMILY MEDICINE

## 2019-02-25 NOTE — PROGRESS NOTES
Assessment/Plan:          Diagnoses and all orders for this visit:    Medicare annual wellness visit, initial    Liver hemangioma  -     Hepatic function panel; Future  -     Basic metabolic panel; Future  -     US liver; Future    Pure hypercholesterolemia  Comments:  Controlled, To follow with low-fat diet, continue medication  Orders:  -     Lipid Panel with Direct LDL reflex; Future  -     Basic metabolic panel; Future    Blood pressure elevated without history of HTN  Comments:  Today blood pressure is normal   Check EKG  Patient declined    Elevated PSA  Comments:  Urology office visit February 12, 2019 noted    BPH with obstruction/lower urinary tract symptoms  Comments:  Urology office visit on February 12/2019 noted    Macrocytosis  Comments:  Corrected            Subjective:     Patient ID: Nishant Quintanilla is a 70 y o  male      Patient is here for follow-up on his chronic medical problem  Hyperlipidemia  Admit to regular fat intake  Denied side effect with his medication  Denied chest pain or skin lesion secondary to hyperlipidemia  Liver hemangioma  Denied abdominal pain  Macrocytosis     Patient admit to drinking alcohol only occasionally  Denied fatigue  Enlarged prostate  Patient is following with Urology  Has had slight urinary symptoms secondary to his large prostate    Test results  Lab done on February 5, 2019  Discussed result with patient  Review of Systems   Constitutional: Negative for activity change, appetite change, chills, fatigue, fever and unexpected weight change  HENT: Negative for congestion, ear pain, sinus pressure, sore throat and trouble swallowing  Eyes: Negative for visual disturbance  Respiratory: Negative for cough, chest tightness, shortness of breath and wheezing  Cardiovascular: Negative for chest pain, palpitations and leg swelling  Gastrointestinal: Negative for abdominal pain, blood in stool, constipation, diarrhea, nausea and vomiting  Endocrine: Negative for polydipsia and polyphagia  Genitourinary: Negative for dysuria, flank pain, frequency, hematuria and urgency  Musculoskeletal: Negative for arthralgias, back pain, gait problem, joint swelling, myalgias and neck pain  Skin: Negative for rash  Neurological: Negative for dizziness, tremors, seizures, syncope, weakness, light-headedness, numbness and headaches  Hematological: Negative for adenopathy  Does not bruise/bleed easily  Psychiatric/Behavioral: Negative for behavioral problems, confusion, dysphoric mood and sleep disturbance  The patient is not nervous/anxious  Objective:     Physical Exam   Constitutional: He is oriented to person, place, and time  He appears well-developed and well-nourished  No distress  HENT:   Head: Normocephalic  Mouth/Throat: Oropharynx is clear and moist  No oropharyngeal exudate  Eyes: Pupils are equal, round, and reactive to light  EOM are normal  No scleral icterus  Neck: Normal range of motion  Neck supple  No JVD present  No tracheal deviation present  Cardiovascular: Normal rate, regular rhythm and normal heart sounds  Exam reveals no gallop and no friction rub  No murmur heard  Pulses:       Carotid pulses are 3+ on the right side, and 3+ on the left side  Dorsalis pedis pulses are 3+ on the right side, and 3+ on the left side  Legs , no edema    Pulmonary/Chest: Effort normal and breath sounds normal    Abdominal: Soft  Bowel sounds are normal  He exhibits no mass  There is no tenderness  Musculoskeletal: Normal range of motion  He exhibits no edema or tenderness  Lymphadenopathy:     He has no cervical adenopathy  Neurological: He is alert and oriented to person, place, and time  No cranial nerve deficit  He exhibits normal muscle tone  Coordination normal    Normal gait   Skin: No rash noted  Psychiatric: He has a normal mood and affect   His behavior is normal

## 2019-02-25 NOTE — PROGRESS NOTES
Assessment and Plan:    Problem List Items Addressed This Visit     None        Health Maintenance Due   Topic Date Due    Depression Screening PHQ  1940    Medicare Annual Wellness Visit (AWV)  1940    BMI: Followup Plan  02/17/1960    HEPATITIS B VACCINES (1 of 3 - Risk 3-dose series) 02/17/1961    DTaP,Tdap,and Td Vaccines (1 - Tdap) 02/17/1963    Fall Risk  02/17/2007    Pneumococcal PPSV23/PCV13 65+ Years / Low and Medium Risk (1 of 2 - PCV13) 02/17/2007         HPI:  Andrew Freeman is a 70 y o  male here for his Initial Wellness Visit      Patient Active Problem List   Diagnosis    Elevated PSA    BPH with obstruction/lower urinary tract symptoms    Macrocytic    Microscopic hematuria     Past Medical History:   Diagnosis Date    BPH with obstruction/lower urinary tract symptoms 2015    Elevated PSA 2012    Frequency of micturition 2015    Gallstone     Hypercholesteremia 2013    Incomplete emptying of bladder 2015    Microhematuria 2016    Nocturia 2012    Poor urinary stream 2015     Past Surgical History:   Procedure Laterality Date    CATARACT EXTRACTION Right 2010    GALLBLADDER SURGERY  12/10/2015    PROSTATE BIOPSY  2011     Family History   Problem Relation Age of Onset    Alzheimer's disease Mother     Dementia Mother     Heart attack Father      Social History     Tobacco Use   Smoking Status Never Smoker   Smokeless Tobacco Never Used     Social History     Substance and Sexual Activity   Alcohol Use Yes    Comment: occasionally      Social History     Substance and Sexual Activity   Drug Use No       Current Outpatient Medications   Medication Sig Dispense Refill    aspirin (ADULT ASPIRIN EC LOW STRENGTH) 81 mg EC tablet Take by mouth      Brimonidine Tartrate-Timolol (COMBIGAN OP) Apply to eye      LUMIGAN 0 01 % ophthalmic drops PLACE 1 DROP INTO BOTH EYES DAILY  5    rosuvastatin (CRESTOR) 10 MG tablet Take 1 tablet (10 mg total) by mouth daily 90 tablet 3  sildenafil (VIAGRA) 100 mg tablet Take 1 tablet (100 mg total) by mouth daily as needed for erectile dysfunction 30 tablet 1     No current facility-administered medications for this visit  No Known Allergies  Immunization History   Administered Date(s) Administered    Influenza, high dose seasonal 0 5 mL 10/23/2018       Patient Care Team:  Shilo Estevez MD as PCP - General    Medicare Screening Tests and Risk Assessments:  Chris Bedoya is here for his Initial Wellness visit  Health Risk Assessment:  Patient rates overall health as good  Patient feels that their physical health rating is Same  Eyesight was rated as Slightly worse  Hearing was rated as Same  Patient feels that their emotional and mental health rating is Same  Pain experienced by patient in the last 7 days has been None  Patient states that he has experienced no weight loss or gain in last 6 months  Emotional/Mental Health:  Patient has been feeling nervous/anxious  PHQ-9 Depression Screening:    Frequency of the following problems over the past two weeks:      1  Little interest or pleasure in doing things: 0 - not at all      2  Feeling down, depressed, or hopeless: 0 - not at all  PHQ-2 Score: 0          Broken Bones/Falls: Fall Risk Assessment:    In the past year, patient has experienced: No history of falling in past year          Bladder/Bowel:  Patient reports no loss of bowel control  Immunizations:  Patient has had a flu vaccination within the last year  Patient has not received a pneumonia shot  Patient has not received a shingles shot  Patient has not received tetanus/diphtheria shot  Home Safety:  Patient does not have trouble with stairs inside or outside of their home  Patient currently reports that there are no safety hazards present in home, working smoke alarms, working carbon monoxide detectors        Preventative Screenings:   prostate cancer screen performed, 2/21/2018  no colon cancer screen completed, cholesterol screen completed, 10/8/2018  glaucoma eye exam completed, (Additional Comments: Pt follows with opthamalogist every 6 months  Pt has glaucoma in OD)    Nutrition:  Current diet: Regular with servings of the following:    Medications:  Patient is currently taking over-the-counter supplements  List of OTC medications includes: vitamin B  Patient is able to manage medications  Lifestyle Choices:  Patient reports no tobacco use  Patient has not smoked or used tobacco in the past   Patient reports alcohol use  Alcohol use per week: socailly  Patient drives a vehicle  Patient wears seat belt  Current level of exercise of physical activity described by patient as: moderate  Activities of Daily Living:  Can get out of bed by his or her self, able to dress self, able to make own meals, able to do own shopping, able to bathe self, can do own laundry/housekeeping, can manage own money, pay bills and track expenses    Previous Hospitalizations:  No hospitalization or ED visit in past 12 months        Advanced Directives:  Patient has decided on a power of   Patient has spoken to designated power of   Patient has completed advanced directive          Preventative Screening/Counseling:      Cardiovascular:      General: Risks and Benefits Discussed     Due for Labs/Analytes/Optional EKG: echocardiogram          Diabetes:      General: Screening Current      Counseling: Healthy Weight          Colorectal Cancer:      General: Risks and Benefits Discussed and Patient Declines          Prostate Cancer:      General: Screening Current      Comments: Patient seen Urology recently and he had elevated PSA        Osteoporosis:      General: Risks and Benefits Discussed and Patient Declines      Counseling: Regular Weightbearing Exercise and Calcium and Vitamin D Intake          AAA:      General: Risks and Benefits Discussed and Patient Declines          Glaucoma:      General: Screening Current      Comments: Patient with known glaucoma he sees Ophthalmology every 6 months        HIV:      General: Risks and Benefits Discussed and Patient Declines          Hepatitis C:      General: Risks and Benefits Discussed and Patient Declines        Advanced Directives:   Patient has living will for healthcare, patient has an advanced directive       Immunizations:      Influenza: Influenza UTD This Year      Pneumococcal: Risks & Benefits Discussed and Patient Declines      Shingrix: Risks & Benefits Discussed and Patient Declines      TDAP: Risks & Benefits Discussed and Patient Declines      Other Preventative Counseling (Non-Medicare):  Alcohol Use, Fall Prevention, Increase physical activity, Car/seat belt/driving safety reviewed, Skin self-exam, Sunscreen use and Weight reduction discussed

## 2019-05-24 ENCOUNTER — HOSPITAL ENCOUNTER (OUTPATIENT)
Dept: ULTRASOUND IMAGING | Facility: HOSPITAL | Age: 79
Discharge: HOME | End: 2019-05-24
Attending: FAMILY MEDICINE
Payer: COMMERCIAL

## 2019-05-24 DIAGNOSIS — D18.03 LIVER HEMANGIOMA: ICD-10-CM

## 2019-05-24 PROCEDURE — 76705 ECHO EXAM OF ABDOMEN: CPT

## 2019-06-12 LAB
ALBUMIN SERPL-MCNC: 4.2 G/DL (ref 3.6–5.1)
ALBUMIN SERPL-MCNC: 4.2 G/DL (ref 3.6–5.1)
ALBUMIN/GLOB SERPL: 1.6 (CALC) (ref 1–2.5)
ALBUMIN/GLOB SERPL: 1.6 (CALC) (ref 1–2.5)
ALP SERPL-CCNC: 78 U/L (ref 40–115)
ALP SERPL-CCNC: 78 U/L (ref 40–115)
ALT SERPL-CCNC: 14 U/L (ref 9–46)
ALT SERPL-CCNC: 14 U/L (ref 9–46)
AST SERPL-CCNC: 14 U/L (ref 10–35)
AST SERPL-CCNC: 14 U/L (ref 10–35)
BILIRUB DIRECT SERPL-MCNC: 0.2 MG/DL
BILIRUB INDIRECT SERPL-MCNC: 1 MG/DL (CALC) (ref 0.2–1.2)
BILIRUB SERPL-MCNC: 1.2 MG/DL (ref 0.2–1.2)
BILIRUB SERPL-MCNC: 1.2 MG/DL (ref 0.2–1.2)
BUN SERPL-MCNC: 15 MG/DL (ref 7–25)
BUN/CREAT SERPL: NORMAL (CALC) (ref 6–22)
CALCIUM SERPL-MCNC: 9.4 MG/DL (ref 8.6–10.3)
CHLORIDE SERPL-SCNC: 106 MMOL/L (ref 98–110)
CHOLEST SERPL-MCNC: 160 MG/DL
CHOLEST/HDLC SERPL: 3.9 (CALC)
CO2 SERPL-SCNC: 31 MMOL/L (ref 20–32)
CREAT SERPL-MCNC: 1.09 MG/DL (ref 0.7–1.18)
GLOBULIN SER CALC-MCNC: 2.6 G/DL (CALC) (ref 1.9–3.7)
GLOBULIN SER CALC-MCNC: 2.6 G/DL (CALC) (ref 1.9–3.7)
GLUCOSE SERPL-MCNC: 92 MG/DL (ref 65–99)
HDLC SERPL-MCNC: 41 MG/DL
LDLC SERPL CALC-MCNC: 93 MG/DL (CALC)
LDLC SERPL DIRECT ASSAY-MCNC: 102 MG/DL
NONHDLC SERPL-MCNC: 119 MG/DL (CALC)
POTASSIUM SERPL-SCNC: 4.4 MMOL/L (ref 3.5–5.3)
PROT SERPL-MCNC: 6.8 G/DL (ref 6.1–8.1)
PROT SERPL-MCNC: 6.8 G/DL (ref 6.1–8.1)
SL AMB EGFR AFRICAN AMERICAN: 75 ML/MIN/1.73M2
SL AMB EGFR NON AFRICAN AMERICAN: 65 ML/MIN/1.73M2
SODIUM SERPL-SCNC: 141 MMOL/L (ref 135–146)
TRIGL SERPL-MCNC: 166 MG/DL

## 2019-06-25 ENCOUNTER — OFFICE VISIT (OUTPATIENT)
Dept: FAMILY MEDICINE CLINIC | Facility: CLINIC | Age: 79
End: 2019-06-25
Payer: COMMERCIAL

## 2019-06-25 VITALS
OXYGEN SATURATION: 95 % | HEIGHT: 69 IN | SYSTOLIC BLOOD PRESSURE: 118 MMHG | DIASTOLIC BLOOD PRESSURE: 70 MMHG | BODY MASS INDEX: 30.39 KG/M2 | HEART RATE: 63 BPM | TEMPERATURE: 97.7 F | WEIGHT: 205.2 LBS

## 2019-06-25 DIAGNOSIS — R03.0 BLOOD PRESSURE ELEVATED WITHOUT HISTORY OF HTN: ICD-10-CM

## 2019-06-25 DIAGNOSIS — D75.89 MACROCYTOSIS: ICD-10-CM

## 2019-06-25 DIAGNOSIS — D18.03 LIVER HEMANGIOMA: ICD-10-CM

## 2019-06-25 DIAGNOSIS — R97.20 ELEVATED PSA: ICD-10-CM

## 2019-06-25 DIAGNOSIS — E78.2 MIXED HYPERLIPIDEMIA: Primary | ICD-10-CM

## 2019-06-25 DIAGNOSIS — R79.89 ABNORMAL LFTS (LIVER FUNCTION TESTS): ICD-10-CM

## 2019-06-25 PROCEDURE — 1160F RVW MEDS BY RX/DR IN RCRD: CPT | Performed by: FAMILY MEDICINE

## 2019-06-25 PROCEDURE — 99214 OFFICE O/P EST MOD 30 MIN: CPT | Performed by: FAMILY MEDICINE

## 2019-06-25 RX ORDER — BIMATOPROST 0.3 MG/ML
1 SOLUTION/ DROPS OPHTHALMIC
COMMUNITY

## 2019-06-25 RX ORDER — BACITRACIN 500 UNIT/G
450 OINTMENT (GRAM) TOPICAL 2 TIMES DAILY
COMMUNITY
End: 2020-12-09

## 2019-09-16 DIAGNOSIS — E78.2 MIXED HYPERLIPIDEMIA: ICD-10-CM

## 2019-09-16 RX ORDER — ROSUVASTATIN CALCIUM 10 MG/1
10 TABLET, COATED ORAL DAILY
Qty: 90 TABLET | Refills: 3 | Status: SHIPPED | OUTPATIENT
Start: 2019-09-16 | End: 2020-06-24

## 2019-11-25 RX ORDER — BRIMONIDINE TARTRATE/TIMOLOL 0.2%-0.5%
DROPS OPHTHALMIC (EYE)
Refills: 6 | COMMUNITY
Start: 2019-08-28

## 2019-11-27 ENCOUNTER — OFFICE VISIT (OUTPATIENT)
Dept: FAMILY MEDICINE CLINIC | Facility: CLINIC | Age: 79
End: 2019-11-27
Payer: COMMERCIAL

## 2019-11-27 VITALS
TEMPERATURE: 96.1 F | BODY MASS INDEX: 30.57 KG/M2 | HEIGHT: 69 IN | OXYGEN SATURATION: 96 % | SYSTOLIC BLOOD PRESSURE: 118 MMHG | WEIGHT: 206.4 LBS | DIASTOLIC BLOOD PRESSURE: 62 MMHG | HEART RATE: 62 BPM

## 2019-11-27 DIAGNOSIS — R97.20 ELEVATED PSA: ICD-10-CM

## 2019-11-27 DIAGNOSIS — D75.89 MACROCYTOSIS: ICD-10-CM

## 2019-11-27 DIAGNOSIS — E78.00 PURE HYPERCHOLESTEROLEMIA: ICD-10-CM

## 2019-11-27 DIAGNOSIS — E66.9 OBESITY (BMI 30-39.9): ICD-10-CM

## 2019-11-27 DIAGNOSIS — Z23 ENCOUNTER FOR IMMUNIZATION: ICD-10-CM

## 2019-11-27 DIAGNOSIS — D18.03 LIVER HEMANGIOMA: Primary | ICD-10-CM

## 2019-11-27 PROCEDURE — 90662 IIV NO PRSV INCREASED AG IM: CPT

## 2019-11-27 PROCEDURE — G0008 ADMIN INFLUENZA VIRUS VAC: HCPCS

## 2019-11-27 PROCEDURE — G0009 ADMIN PNEUMOCOCCAL VACCINE: HCPCS

## 2019-11-27 PROCEDURE — 99214 OFFICE O/P EST MOD 30 MIN: CPT | Performed by: FAMILY MEDICINE

## 2019-11-27 PROCEDURE — 90670 PCV13 VACCINE IM: CPT

## 2019-11-27 NOTE — PROGRESS NOTES
Assessment/Plan:          Diagnoses and all orders for this visit:    Liver hemangioma  -     Comprehensive metabolic panel; Future    Macrocytosis  -     Comprehensive metabolic panel; Future  -     CBC and differential; Future    Pure hypercholesterolemia  Comments: Follow with low-fat diet  Orders:  -     Comprehensive metabolic panel; Future  -     Lipid Panel with Direct LDL reflex; Future    Obesity (BMI 30-39  9)  Comments:  Advised to lose weight    Elevated PSA  Comments: To follow with Urology    Encounter for immunization  -     influenza vaccine, [id number], high-dose, PF 0 5 mL (FLUZONE HIGH-DOSE)  -     PNEUMOCOCCAL CONJUGATE VACCINE 13-VALENT GREATER THAN 6 MONTHS            Subjective:     Patient ID: Gloria Krause is a 70 y o  male      Patient is here for follow-up on his chronic medical problem  Liver hemangioma  Denied abdominal pain  Hyperlipidemia admit to regular fat intake  Denied side effect with medication  Denied chest pain  Elevated PSA  he is following with Urology  Doing well  Obesity  He is not watching his diet or exercise  Denied weight gain  Macrocytosis  Patient denied drinking alcohol  Denied fatigue          Review of Systems   Constitutional: Negative for appetite change and fatigue  HENT: Negative for ear pain, tinnitus, trouble swallowing and voice change  Eyes: Negative for photophobia, pain and visual disturbance  Respiratory: Negative for cough, chest tightness and wheezing  Cardiovascular: Negative for chest pain, palpitations and leg swelling  Gastrointestinal: Negative for abdominal distention, abdominal pain, anal bleeding, constipation, diarrhea, nausea and rectal pain  Endocrine: Negative for cold intolerance, heat intolerance, polydipsia and polyuria  Genitourinary: Negative for decreased urine volume, difficulty urinating, dysuria, flank pain, frequency, hematuria and urgency     Musculoskeletal: Negative for arthralgias, back pain, gait problem, myalgias and neck pain  Skin: Negative for pallor and rash  Allergic/Immunologic: Negative for immunocompromised state  Neurological: Negative for dizziness, seizures, syncope and speech difficulty  Hematological: Negative for adenopathy  Does not bruise/bleed easily  Psychiatric/Behavioral: Negative for agitation, confusion and hallucinations  The patient is not nervous/anxious  Objective:     Physical Exam   Constitutional: He is oriented to person, place, and time  He appears well-developed and well-nourished  No distress  HENT:   Head: Normocephalic  Mouth/Throat: Oropharynx is clear and moist  No oropharyngeal exudate  Eyes: Pupils are equal, round, and reactive to light  EOM are normal  No scleral icterus  Neck: Normal range of motion  Neck supple  No JVD present  No tracheal deviation present  Cardiovascular: Normal rate, regular rhythm, normal heart sounds and intact distal pulses  Exam reveals no gallop and no friction rub  No murmur heard  Pulses:       Carotid pulses are 3+ on the right side, and 3+ on the left side  Legs , no edema    Pulmonary/Chest: Effort normal and breath sounds normal  He has no rales  Abdominal: Soft  Bowel sounds are normal  He exhibits no mass  There is no tenderness  Musculoskeletal: Normal range of motion  He exhibits no edema or tenderness  Lymphadenopathy:     He has no cervical adenopathy  Neurological: He is alert and oriented to person, place, and time  No cranial nerve deficit or sensory deficit  He exhibits normal muscle tone  Coordination normal    Normal gait   Skin: No rash noted  Psychiatric: He has a normal mood and affect  His behavior is normal    BMI Counseling: Body mass index is 30 48 kg/m²  The BMI is above normal  Nutrition recommendations include decreasing portion sizes

## 2019-11-29 PROBLEM — D75.89 MACROCYTOSIS: Status: ACTIVE | Noted: 2019-11-29

## 2019-11-29 PROBLEM — E66.9 OBESITY (BMI 30-39.9): Status: ACTIVE | Noted: 2019-11-29

## 2019-11-29 PROBLEM — E78.00 PURE HYPERCHOLESTEROLEMIA: Status: ACTIVE | Noted: 2019-11-29

## 2019-11-29 PROBLEM — D18.03 LIVER HEMANGIOMA: Status: ACTIVE | Noted: 2019-11-29

## 2020-03-19 ENCOUNTER — TELEPHONE (OUTPATIENT)
Dept: UROLOGY | Facility: AMBULATORY SURGERY CENTER | Age: 80
End: 2020-03-19

## 2020-03-19 DIAGNOSIS — R97.20 ELEVATED PSA: Primary | ICD-10-CM

## 2020-03-19 NOTE — TELEPHONE ENCOUNTER
Pt is aware,  Will forward to clerical to schedule MRI of the prostate  Pt is to have PSA done in 3 months

## 2020-03-19 NOTE — TELEPHONE ENCOUNTER
Patient's most recent PSA resulted 9 0  This is an increase from prior evaluation  Per previous office note with Dr Renny Corea, evaluation with MRI of prostate was discussed  With this increase of the PSA to 9 0 by recommendation would be to proceed with prostate MRI before next appointment 05/2020  I have placed orders in epic for patient to schedule and proceed  I would also like for him to have his PSA checked in 3 months  Thank you

## 2020-03-25 ENCOUNTER — TELEMEDICINE (OUTPATIENT)
Dept: FAMILY MEDICINE CLINIC | Facility: CLINIC | Age: 80
End: 2020-03-25
Payer: COMMERCIAL

## 2020-03-25 ENCOUNTER — TELEPHONE (OUTPATIENT)
Dept: FAMILY MEDICINE CLINIC | Facility: CLINIC | Age: 80
End: 2020-03-25

## 2020-03-25 DIAGNOSIS — R97.20 ELEVATED PSA: ICD-10-CM

## 2020-03-25 DIAGNOSIS — H25.9 AGE-RELATED CATARACT OF BOTH EYES, UNSPECIFIED AGE-RELATED CATARACT TYPE: ICD-10-CM

## 2020-03-25 DIAGNOSIS — D75.89 MACROCYTOSIS: ICD-10-CM

## 2020-03-25 DIAGNOSIS — D72.10 EOSINOPHILIA: ICD-10-CM

## 2020-03-25 DIAGNOSIS — E78.2 MIXED HYPERLIPIDEMIA: ICD-10-CM

## 2020-03-25 DIAGNOSIS — D18.03 LIVER HEMANGIOMA: Primary | ICD-10-CM

## 2020-03-25 DIAGNOSIS — R79.89 ABNORMAL LFTS (LIVER FUNCTION TESTS): ICD-10-CM

## 2020-03-25 PROCEDURE — 99214 OFFICE O/P EST MOD 30 MIN: CPT | Performed by: FAMILY MEDICINE

## 2020-03-25 NOTE — PROGRESS NOTES
Virtual Regular Visit    Problem List Items Addressed This Visit        Digestive    Liver hemangioma - Primary    Relevant Orders    US liver       Other    Elevated PSA    Macrocytosis      Other Visit Diagnoses     Mixed hyperlipidemia        To follow with low-fat diet    Eosinophilia        Relevant Orders    CBC and differential    Abnormal LFTs (liver function tests)        Within normal limit except bilirubin    Relevant Orders    Hepatic function panel    Age-related cataract of both eyes, unspecified age-related cataract type        Ophthalmology consult on January 20, 2020 noted        Diagnoses and all orders for this visit:    Liver hemangioma  -     US liver; Future    Macrocytosis  Comments:  corrected    Elevated PSA  Comments: To follow with Urology  Mixed hyperlipidemia  Comments: To follow with low-fat diet    Eosinophilia  -     CBC and differential; Future    Abnormal LFTs (liver function tests)  Comments:  Within normal limit except bilirubin  Orders:  -     Hepatic function panel; Future    Age-related cataract of both eyes, unspecified age-related cataract type  Comments:  Ophthalmology consult on January 20, 2020 noted        Falls Plan of Care: Home safety education provided  Reason for visit is   Follow-up chronic medical problems    Encounter provider Landy Rodriguez MD    Provider located at [address] [place] [id number]      Recent Visits  No visits were found meeting these conditions  Showing recent visits within past 7 days and meeting all other requirements     Future Appointments  No visits were found meeting these conditions  Showing future appointments within next 150 days and meeting all other requirements        After connecting through [employer], the patient was identified by name and date of birth   Gavin Grubbs was informed that this is a telemedicine visit and that the visit is being conducted through telephone which may not be secure and therefore, might not be HIPAA-compliant  My office door was closed  No one else was in the room  He acknowledged consent and understanding of privacy and security of the video platform  The patient has agreed to participate and understands they can discontinue the visit at any time  Subjective  Rampart Luba is a 68 y o  male   Patient feels well has no new complaint  Except he did see his eye doctor and he he scheduled for cataract surgery on April 21st   Liver hemangioma  Denied abdominal pain  Abnormal liver function test   Denied nausea, jaundice  Hyperlipidemia he said he is watching his fat intake denied chest pain  Denied side effect with the Crestor  Elevated PSA  Recently he had blood test and his PSA is very high he is going to have prostate MRI  Macrocytosis patient denied drinking alcohol  Microscopic hematuria  Denied hematuria he is following with Urology  Test results    Lab done on 03/18/2020 noted discussed result with patient  Past Medical History:   Diagnosis Date    BPH with obstruction/lower urinary tract symptoms 2015    Elevated PSA 2012    Frequency of micturition 2015    Gallstone     Hypercholesteremia 2013    Incomplete emptying of bladder 2015    Microhematuria 2016    Nocturia 2012    Poor urinary stream 2015       Past Surgical History:   Procedure Laterality Date    CATARACT EXTRACTION Right 2010    GALLBLADDER SURGERY  12/10/2015    PROSTATE BIOPSY  2011       Current Outpatient Medications   Medication Sig Dispense Refill    aspirin 81 MG tablet Take 81 mg by mouth daily      bimatoprost (LUMIGAN) 0 03 % ophthalmic drops 1 drop      COMBIGAN 0 2-0 5 % INSTILL 1 DROP INTO RIGHT EYE TWICE A DAY  6    Cyanocobalamin 1000 MCG CAPS Take 1,000 mcg by mouth daily      rosuvastatin (CRESTOR) 10 MG tablet Take 1 tablet (10 mg total) by mouth daily 90 tablet 3    Saw Palmetto 160 MG CAPS Take 450 mg by mouth      sildenafil (VIAGRA) 100 mg tablet Take 1 tablet (100 mg total) by mouth daily as needed for erectile dysfunction 30 tablet 1     No current facility-administered medications for this visit  No Known Allergies    Review of Systems   Constitutional: Negative for activity change, appetite change, chills, fatigue, fever and unexpected weight change  HENT: Negative for congestion, ear discharge, ear pain, hearing loss, nosebleeds, rhinorrhea, sinus pressure, sore throat, tinnitus, trouble swallowing and voice change  Eyes: Negative for photophobia, pain and visual disturbance  Respiratory: Negative for cough, chest tightness, shortness of breath and wheezing  Cardiovascular: Negative for chest pain, palpitations and leg swelling  Gastrointestinal: Negative for abdominal pain, anal bleeding, blood in stool, constipation, diarrhea, nausea and vomiting  Endocrine: Negative for cold intolerance, heat intolerance, polydipsia and polyuria  Genitourinary: Negative for dysuria, frequency, hematuria and urgency  Musculoskeletal: Negative for arthralgias, back pain, gait problem, joint swelling, myalgias and neck pain  Skin: Negative for rash  Neurological: Negative for dizziness, tremors, seizures, syncope, weakness, light-headedness and headaches  Hematological: Negative for adenopathy  Does not bruise/bleed easily  Psychiatric/Behavioral: Negative for agitation, behavioral problems, confusion, dysphoric mood, hallucinations and sleep disturbance  The patient is not nervous/anxious  I spent 15 minutes with the patient during this visit    Vit code 33133

## 2020-04-03 ENCOUNTER — HOSPITAL ENCOUNTER (OUTPATIENT)
Dept: RADIOLOGY | Age: 80
Discharge: HOME | End: 2020-04-03
Payer: COMMERCIAL

## 2020-04-03 DIAGNOSIS — R97.20 ELEVATED PSA: ICD-10-CM

## 2020-04-03 PROCEDURE — A9585 GADOBUTROL INJECTION: HCPCS | Performed by: NURSE PRACTITIONER

## 2020-04-03 PROCEDURE — 72197 MRI PELVIS W/O & W/DYE: CPT

## 2020-04-03 PROCEDURE — 76377 3D RENDER W/INTRP POSTPROCES: CPT

## 2020-04-03 RX ADMIN — GADOBUTROL 9 ML: 604.72 INJECTION INTRAVENOUS at 14:44

## 2020-04-13 ENCOUNTER — TELEPHONE (OUTPATIENT)
Dept: UROLOGY | Facility: AMBULATORY SURGERY CENTER | Age: 80
End: 2020-04-13

## 2020-05-05 ENCOUNTER — TELEMEDICINE (OUTPATIENT)
Dept: UROLOGY | Facility: MEDICAL CENTER | Age: 80
End: 2020-05-05
Payer: COMMERCIAL

## 2020-05-05 DIAGNOSIS — N13.8 BPH WITH OBSTRUCTION/LOWER URINARY TRACT SYMPTOMS: Primary | ICD-10-CM

## 2020-05-05 DIAGNOSIS — R97.20 ELEVATED PSA: ICD-10-CM

## 2020-05-05 DIAGNOSIS — R31.29 MICROSCOPIC HEMATURIA: ICD-10-CM

## 2020-05-05 DIAGNOSIS — N40.1 BPH WITH OBSTRUCTION/LOWER URINARY TRACT SYMPTOMS: Primary | ICD-10-CM

## 2020-05-05 PROCEDURE — 99214 OFFICE O/P EST MOD 30 MIN: CPT | Performed by: UROLOGY

## 2020-05-13 ENCOUNTER — OFFICE VISIT (OUTPATIENT)
Dept: FAMILY MEDICINE CLINIC | Facility: CLINIC | Age: 80
End: 2020-05-13
Payer: COMMERCIAL

## 2020-05-13 VITALS
HEIGHT: 69 IN | SYSTOLIC BLOOD PRESSURE: 124 MMHG | TEMPERATURE: 97.8 F | HEART RATE: 66 BPM | RESPIRATION RATE: 16 BRPM | DIASTOLIC BLOOD PRESSURE: 71 MMHG | OXYGEN SATURATION: 99 % | BODY MASS INDEX: 30.54 KG/M2 | WEIGHT: 206.2 LBS

## 2020-05-13 DIAGNOSIS — R79.89 ABNORMAL LFTS (LIVER FUNCTION TESTS): ICD-10-CM

## 2020-05-13 DIAGNOSIS — H26.9 CATARACT OF LEFT EYE, UNSPECIFIED CATARACT TYPE: ICD-10-CM

## 2020-05-13 DIAGNOSIS — D18.03 LIVER HEMANGIOMA: ICD-10-CM

## 2020-05-13 DIAGNOSIS — R03.0 BLOOD PRESSURE ELEVATED WITHOUT HISTORY OF HTN: ICD-10-CM

## 2020-05-13 DIAGNOSIS — Z01.818 PREOPERATIVE CLEARANCE: Primary | ICD-10-CM

## 2020-05-13 DIAGNOSIS — R97.20 ELEVATED PSA: ICD-10-CM

## 2020-05-13 DIAGNOSIS — R93.89 ABNORMAL MRI: ICD-10-CM

## 2020-05-13 PROCEDURE — 3008F BODY MASS INDEX DOCD: CPT | Performed by: FAMILY MEDICINE

## 2020-05-13 PROCEDURE — 1036F TOBACCO NON-USER: CPT | Performed by: FAMILY MEDICINE

## 2020-05-13 PROCEDURE — 99214 OFFICE O/P EST MOD 30 MIN: CPT | Performed by: FAMILY MEDICINE

## 2020-05-13 PROCEDURE — 4040F PNEUMOC VAC/ADMIN/RCVD: CPT | Performed by: FAMILY MEDICINE

## 2020-05-13 PROCEDURE — 1160F RVW MEDS BY RX/DR IN RCRD: CPT | Performed by: FAMILY MEDICINE

## 2020-05-27 DIAGNOSIS — R97.20 ELEVATED PSA: Primary | ICD-10-CM

## 2020-05-27 RX ORDER — CIPROFLOXACIN 500 MG/1
500 TABLET, FILM COATED ORAL EVERY 12 HOURS SCHEDULED
Qty: 2 TABLET | Refills: 0 | Status: SHIPPED | OUTPATIENT
Start: 2020-05-27 | End: 2020-05-28

## 2020-05-27 RX ORDER — LORAZEPAM 2 MG/1
2 TABLET ORAL ONCE
Qty: 1 TABLET | Refills: 0 | Status: SHIPPED | OUTPATIENT
Start: 2020-05-27 | End: 2020-12-09

## 2020-06-03 ENCOUNTER — TELEPHONE (OUTPATIENT)
Dept: UROLOGY | Facility: MEDICAL CENTER | Age: 80
End: 2020-06-03

## 2020-06-16 ENCOUNTER — PROCEDURE VISIT (OUTPATIENT)
Dept: UROLOGY | Facility: MEDICAL CENTER | Age: 80
End: 2020-06-16
Payer: COMMERCIAL

## 2020-06-16 VITALS
SYSTOLIC BLOOD PRESSURE: 158 MMHG | TEMPERATURE: 97.1 F | WEIGHT: 206 LBS | BODY MASS INDEX: 30.51 KG/M2 | HEIGHT: 69 IN | DIASTOLIC BLOOD PRESSURE: 78 MMHG

## 2020-06-16 DIAGNOSIS — R97.20 ELEVATED PSA: Primary | ICD-10-CM

## 2020-06-16 DIAGNOSIS — N40.1 BPH WITH OBSTRUCTION/LOWER URINARY TRACT SYMPTOMS: ICD-10-CM

## 2020-06-16 DIAGNOSIS — R93.89 ABNORMAL MRI: ICD-10-CM

## 2020-06-16 DIAGNOSIS — N13.8 BPH WITH OBSTRUCTION/LOWER URINARY TRACT SYMPTOMS: ICD-10-CM

## 2020-06-16 PROCEDURE — 76872 US TRANSRECTAL: CPT | Performed by: UROLOGY

## 2020-06-16 PROCEDURE — 3008F BODY MASS INDEX DOCD: CPT | Performed by: UROLOGY

## 2020-06-16 PROCEDURE — G0416 PROSTATE BIOPSY, ANY MTHD: HCPCS | Performed by: PATHOLOGY

## 2020-06-16 PROCEDURE — 1160F RVW MEDS BY RX/DR IN RCRD: CPT | Performed by: UROLOGY

## 2020-06-16 PROCEDURE — 4040F PNEUMOC VAC/ADMIN/RCVD: CPT | Performed by: UROLOGY

## 2020-06-16 PROCEDURE — 99214 OFFICE O/P EST MOD 30 MIN: CPT | Performed by: UROLOGY

## 2020-06-16 PROCEDURE — 76942 ECHO GUIDE FOR BIOPSY: CPT | Performed by: UROLOGY

## 2020-06-16 PROCEDURE — 55700 PR BIOPSY OF PROSTATE,NEEDLE/PUNCH: CPT | Performed by: UROLOGY

## 2020-06-16 PROCEDURE — 1036F TOBACCO NON-USER: CPT | Performed by: UROLOGY

## 2020-06-16 RX ORDER — LORAZEPAM 2 MG/1
TABLET ORAL
COMMUNITY
Start: 2020-05-27 | End: 2020-08-06

## 2020-06-16 RX ORDER — CEFTRIAXONE 1 G/1
1000 INJECTION, POWDER, FOR SOLUTION INTRAMUSCULAR; INTRAVENOUS ONCE
Status: COMPLETED | OUTPATIENT
Start: 2020-06-16 | End: 2020-06-16

## 2020-06-16 RX ADMIN — CEFTRIAXONE 1000 MG: 1 INJECTION, POWDER, FOR SOLUTION INTRAMUSCULAR; INTRAVENOUS at 09:50

## 2020-06-23 ENCOUNTER — OFFICE VISIT (OUTPATIENT)
Dept: UROLOGY | Facility: MEDICAL CENTER | Age: 80
End: 2020-06-23
Payer: COMMERCIAL

## 2020-06-23 VITALS
WEIGHT: 207 LBS | SYSTOLIC BLOOD PRESSURE: 138 MMHG | BODY MASS INDEX: 30.66 KG/M2 | DIASTOLIC BLOOD PRESSURE: 80 MMHG | TEMPERATURE: 97.3 F | HEIGHT: 69 IN

## 2020-06-23 DIAGNOSIS — C61 PROSTATE CANCER (HCC): Primary | ICD-10-CM

## 2020-06-23 PROCEDURE — 4040F PNEUMOC VAC/ADMIN/RCVD: CPT | Performed by: UROLOGY

## 2020-06-23 PROCEDURE — 1160F RVW MEDS BY RX/DR IN RCRD: CPT | Performed by: UROLOGY

## 2020-06-23 PROCEDURE — 3008F BODY MASS INDEX DOCD: CPT | Performed by: UROLOGY

## 2020-06-23 PROCEDURE — 1036F TOBACCO NON-USER: CPT | Performed by: UROLOGY

## 2020-06-23 PROCEDURE — 99215 OFFICE O/P EST HI 40 MIN: CPT | Performed by: UROLOGY

## 2020-06-24 DIAGNOSIS — E78.2 MIXED HYPERLIPIDEMIA: ICD-10-CM

## 2020-06-24 RX ORDER — ROSUVASTATIN CALCIUM 10 MG/1
TABLET, COATED ORAL
Qty: 90 TABLET | Refills: 3 | Status: SHIPPED | OUTPATIENT
Start: 2020-06-24 | End: 2021-04-27

## 2020-07-02 ENCOUNTER — TELEPHONE (OUTPATIENT)
Dept: RADIATION ONCOLOGY | Facility: CLINIC | Age: 80
End: 2020-07-02

## 2020-07-02 NOTE — TELEPHONE ENCOUNTER
Radiation Oncology- spoke w/ Mr Marco Bautista to assist in scheduling a NP Consult with RAD/ONC - Mr Marco Bautista would like to wait until his case is discussed at working group on 7/21 and he is advised of their recommendations  Mr Marco Bautista was in agreement with our Department following up with him after working group discussion  Contact information for RAD ONC Department was provided and patient was very appreciative  [address] [employer] [facility] RAD ONC   [place]

## 2020-07-21 ENCOUNTER — DOCUMENTATION (OUTPATIENT)
Dept: UROLOGY | Facility: AMBULATORY SURGERY CENTER | Age: 80
End: 2020-07-21

## 2020-07-21 ENCOUNTER — PATIENT OUTREACH (OUTPATIENT)
Dept: UROLOGY | Facility: AMBULATORY SURGERY CENTER | Age: 80
End: 2020-07-21

## 2020-07-21 NOTE — PROGRESS NOTES
Andrés Berrios was discussed at the Urology Case Review meeting this morning  Called him and made him aware of the recommendation of the meeting  He would like to schedule a consult visit with Radiation Oncology  Called Rad Onc  Left a message requesting that they reach out to the patient to schedule a consult visit  Andrés Berrios is currently not scheduled for follow up with Dr Brandon Rodríguez

## 2020-07-21 NOTE — PROGRESS NOTES
Oncology Navigator Note: Multidisciplinary Urology Case Review 7/21/20  Physician Recommended Plan    Artist Aldo is a 68 y o  male    Diagnosis: Prostate Cancer, [place] 7    Patient was discussed at the Multidisciplinary Urology Case review on 7/21/20  The group recommended that the patient should have definitive treatment for his Prostate Cancer with radiation therapy

## 2020-07-23 NOTE — PROGRESS NOTES
Once the patient sees the radiologist he will likely need fiducial placement and [employer]  This will likely be arranged with the 1st available provider  The patient can see me back approximately 6 weeks after he completes radiation therapy  Perhaps it would be prudent to make a 6 month visit from now and we can adjust when we know the timing of his radiation therapy

## 2020-07-28 ENCOUNTER — TELEPHONE (OUTPATIENT)
Dept: FAMILY MEDICINE CLINIC | Facility: CLINIC | Age: 80
End: 2020-07-28

## 2020-07-28 NOTE — TELEPHONE ENCOUNTER
----- Message from Gisele Ferguson MD sent at 7/28/2020 11:25 AM EDT -----  Please advise patient of normal lab results  Including liver tests and blood count

## 2020-08-03 PROBLEM — C61 PROSTATE CANCER (HCC): Status: ACTIVE | Noted: 2020-08-03

## 2020-08-06 ENCOUNTER — CLINICAL SUPPORT (OUTPATIENT)
Dept: RADIATION ONCOLOGY | Facility: CLINIC | Age: 80
End: 2020-08-06
Attending: RADIOLOGY
Payer: COMMERCIAL

## 2020-08-06 ENCOUNTER — TELEPHONE (OUTPATIENT)
Dept: RADIATION ONCOLOGY | Facility: CLINIC | Age: 80
End: 2020-08-06

## 2020-08-06 VITALS
DIASTOLIC BLOOD PRESSURE: 60 MMHG | HEART RATE: 71 BPM | RESPIRATION RATE: 18 BRPM | SYSTOLIC BLOOD PRESSURE: 106 MMHG | BODY MASS INDEX: 30.43 KG/M2 | HEIGHT: 69 IN | OXYGEN SATURATION: 99 % | WEIGHT: 205.47 LBS | TEMPERATURE: 97.5 F

## 2020-08-06 DIAGNOSIS — C61 PROSTATE CANCER (HCC): ICD-10-CM

## 2020-08-06 DIAGNOSIS — C61 PROSTATE CANCER (HCC): Primary | ICD-10-CM

## 2020-08-06 PROCEDURE — 99211 OFF/OP EST MAY X REQ PHY/QHP: CPT | Performed by: RADIOLOGY

## 2020-08-06 PROCEDURE — G0463 HOSPITAL OUTPT CLINIC VISIT: HCPCS | Performed by: RADIOLOGY

## 2020-08-06 NOTE — PROGRESS NOTES
Horace Wilkins 1940 is a 68 y o  male     Patient presents today for radiation consult for newly diagnosed Felipe 7(3+4) prostate cancer  He is referred by Dr Justo Bowman  Patient's wife, Venkat Simmons is present for today's consult  68year old male followed by urology for BPH, microscopic hematuria and long history of PSA elevation with prostate biopsy in the past, approximately 8-10 years ago  Rise in PSA to 9 0 on 3/18/20, prostate MRI recommended  Family history of father with prostate cancer  PSA Trend  1/10/18 PSA 7 8  2/5/19 PSA 5 7  3/18/20 PSA 9 0    4/3/20 Multiparametric prostate MRI  IMPRESSION:   1  PI-RADSv2 Category 4 -High (clinically significant cancer is likely to be present)  1 0 x 0 4 x 0 5 cm nodule in the right posteromedial PZ, mid gland  2  No extraprostatic tumor, seminal vesicle invasion, or pelvic osseous metastatic disease  3   Several small, nonspecific perirectal and right perivesical lymph nodes  4   Calculated prostate volume of 168 mL     6/16/20 MRI fusion guided prostate biopsy  Prostatic adenocarcinoma, [place] 7 (3+4) and [place] 6 (3+3),  7/13 cores positive      6/23/20 Dr Justo LUCERO  Treatment options discussed, surgical approach not recommended  Refer to radiation oncology  Send path for Prolaris tesing    Polaris Molecular score 3 9    7/21/20 Discussed at Urology Case Review: The group recommended that the patient should have definitive treatment for his Prostate Cancer with radiation therapy  1/19/21 Dr Justo LUCERO      Oncology History   Prostate cancer [facility])   6/16/2020 Biopsy    A  Prostate, r apex x5 (MRI Fusion), needle biopsy:      -Prostatic adenocarcinoma  -Felipe score: 3+3 = 6  -Tumor extent: tumor involves all five submitted cores  -Tumor volume: tumor involves  10%, 60% , 60,%, 70%, 80% of each core biopsy   -Perineural invasion:  Present, few foci     B   Prostate, l lat base needle biopsy:      -Small focus of adenocarcinoma of the prostate  - [place] grade 3+3 = 6   -Tumor extent: single focus in one submitted core  -Tumor volume: tumor involves approximately 5% of biopsy volume   -Perineural invasion: not identified     C  Prostate, l lat mid needle biopsy:      -Prostatic adenocarcinoma  - [place] grade 3+3=6   -Tumor extent: single focus in one submitted core  -Tumor volume: tumor involves approximately 15% of biopsy volume   -Perineural invasion: not identified     D  Prostate, l lat apex needle biopsy:    -Benign prostatic tissue     E  Prostate, l base needle biopsy:      Prostatic adenocarcinoma  - [place] grade 3+ 4 = 7   -Tumor extent: single focus in one submitted core  -Tumor volume: tumor  involves approximately 20% of biopsy volume   -Perineural invasion: Present     F  Prostate, l mid needle biopsy:      Prostatic adenocarcinoma  - [place] grade 3+ 3=6   -Tumor extent: single focus in one submitted core  -Tumor volume: tumor discontinuously involves 50% of biopsy volume   -Perineural invasion: not identified     G  Prostate, l apex needle biopsy:    -Benign prostatic tissue     H  Prostate, r lat base needle biopsy:      Prostatic adenocarcinoma  - [place] grade 3+ 4 = 7   -Tumor extent: single focus in one submitted core  -Tumor volume: tumor involves approximately 20% of biopsy volume   -Perineural invasion: not identified     I  Prostate, r lat mid needle biopsy:    -Benign prostatic tissue     J  Prostate, r lat apex needle biopsy:    -Benign prostatic tissue     K  Prostate, r base needle biopsy:      Prostatic adenocarcinoma  - Felipe grade 3+ 3=6   -Tumor extent: single focus in one submitted core  -Tumor volume: tumor discontinuously involves 50% of biopsy volume   -Perineural invasion: not identified     L  Prostate, r mid needle biopsy:    -Benign prostatic tissue     M   Prostate, r apex needle biopsy:    -Benign prostatic tissue with mild acute & chronic inflammation        8/3/2020 Initial Diagnosis    Prostate cancer [facility])         Clinical Trial: no      Health Maintenance   Topic Date Due    Fall Risk  02/25/2020   Nelida Thapa Medicare Annual Wellness Visit (AWV)  02/25/2020    BMI: Followup Plan  11/27/2020    Hepatitis B Vaccine (1 of 3 - Risk 3-dose series) 08/31/2020 (Originally 2/17/1959)    Influenza Vaccine  09/19/2020 (Originally 7/1/2020)    DTaP,Tdap,and Td Vaccines (1 - Tdap) 05/12/2021 (Originally 2/17/1961)    Hepatitis A Vaccine (1 of 2 - Risk 2-dose series) 05/12/2021 (Originally 2/17/1941)    Pneumococcal Vaccine: 65+ Years (2 of 2 - PPSV23) 11/27/2020    Depression Screening PHQ  05/13/2021    BMI: Adult  06/23/2021    HIB Vaccine  Aged Out    IPV Vaccine  Aged Out    Meningococcal ACWY Vaccine  Aged Out    HPV Vaccine  Aged Out       Past Medical History:   Diagnosis Date    BPH with obstruction/lower urinary tract symptoms 2015    Elevated PSA 2012    Frequency of micturition 2015    Gallstone     Hypercholesteremia 2013    Incomplete emptying of bladder 2015    Microhematuria 2016    Nocturia 2012    Poor urinary stream 2015    Prostate cancer ([facility] [address] )        Past Surgical History:   Procedure Laterality Date    CATARACT EXTRACTION Right 2010    GALLBLADDER SURGERY  12/10/2015    PROSTATE BIOPSY  2011       Family History   Problem Relation Age of Onset    Alzheimer's disease Mother     Dementia Mother     Heart attack Father     Prostate cancer Father        Social History     Tobacco Use    Smoking status: Never Smoker    Smokeless tobacco: Never Used   Substance Use Topics    Alcohol use: Yes     Comment: occasionally    Drug use: No          Current Outpatient Medications:     aspirin 81 MG tablet, Take 81 mg by mouth daily, Disp: , Rfl:     bimatoprost (LUMIGAN) 0 03 % ophthalmic drops, Administer 1 drop to the right eye daily at bedtime , Disp: , Rfl:     COMBIGAN 0 2-0 5 %, INSTILL 1 DROP INTO RIGHT EYE TWICE A DAY, Disp: , Rfl: 6    Cyanocobalamin 1000 MCG CAPS, Take 1,000 mcg by mouth daily, Disp: , Rfl:     rosuvastatin (CRESTOR) 10 MG tablet, TAKE 1 TABLET BY MOUTH EVERY DAY, Disp: 90 tablet, Rfl: 3    Saw Palmetto 160 MG CAPS, Take 450 mg by mouth 2 (two) times a day , Disp: , Rfl:     sildenafil (VIAGRA) 100 mg tablet, Take 1 tablet (100 mg total) by mouth daily as needed for erectile dysfunction, Disp: 30 tablet, Rfl: 1    LORazepam (ATIVAN) 2 mg tablet, Take 1 tablet (2 mg total) by mouth once for 1 dose Bring to appointment for day of procedure, Disp: 1 tablet, Rfl: 0    No Known Allergies     Review of Systems:  Review of Systems   Constitutional: Negative  HENT: Negative  Eyes: Negative  Respiratory: Negative  Cardiovascular: Negative  Gastrointestinal: Negative  Endocrine: Negative  Genitourinary: Positive for difficulty urinating (occasional weak stream), frequency and urgency (occasional)  Negative for dysuria and hematuria  Nocturia x2-3  No incontinence  Musculoskeletal: Negative  Skin: Negative  Allergic/Immunologic: Negative  Hematological: Negative  Psychiatric/Behavioral: Negative  Vitals:    08/06/20 0935   BP: 106/60   Pulse: 71   Resp: 18   Temp: 97 5 °F (36 4 °C)   TempSrc: Temporal   SpO2: 99%   Weight: 93 2 kg (205 lb 7 5 oz)   Height: 5' 9" (1 753 m)       Pain Score: 0-No pain    Pain assessment: 0    Imaging:No images are attached to the encounter       Teaching: NCI RT packet, RT to prostate    MST: completed

## 2020-08-06 NOTE — PROGRESS NOTES
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  History of Present Illness   Bella Perez is a 68y o  year old male who presents with history of elevated PSA as far back as a 8-10 years ago at which time he had a prostate biopsy that was benign  He has been followed for BPH symptoms and had microscopic hematuria  In March of this year his PSA was 9  He underwent multi parametric MRI of the prostate in April which described a 7 2 cm size prostate with volume of 168 cc there was a cm lesion in the right posterior medial peripheral zone assessment category 4 high likely for cancer  There may be 1 3 cm enlarged lymph node in the right perivesical fat  An MRI guided fusion biopsy of the prostate was perform   7 of 13 cores were positive  Highest Felipe score was 3+4=7  Most of the cancer volume was [place] score 3+3=6      He also had polaris gene testing which came back with a score of 3 9  Historical Information   Oncology History   Prostate cancer ([facility] [address] )   2020 Biopsy    A  Prostate, r apex x5 (MRI Fusion), needle biopsy:      -Prostatic adenocarcinoma  -Felipe score: 3+3 = 6  -Tumor extent: tumor involves all five submitted cores  -Tumor volume: tumor involves  10%, 60% , 60,%, 70%, 80% of each core biopsy   -Perineural invasion:  Present, few foci     B  Prostate, l lat base needle biopsy:      -Small focus of adenocarcinoma of the prostate  - [place] grade 3+3 = 6   -Tumor extent: single focus in one submitted core  -Tumor volume: tumor involves approximately 5% of biopsy volume   -Perineural invasion: not identified     C   Prostate, l lat mid needle biopsy: Subjective:       Patient ID: Erasmo Dunbar is a 83 y.o. male.    Chief Complaint: Follow-up    Pt is a 83 y.o. male who presents for evaluation and management of   Encounter Diagnoses   Name Primary?    Type 2 diabetes mellitus with chronic kidney disease, with long-term current use of insulin, unspecified CKD stage Yes    Essential hypertension     Hyperlipidemia, unspecified hyperlipidemia type     Benign non-nodular prostatic hyperplasia without lower urinary tract symptoms    .right total knee in the interim     Doing well on current meds. Denies any side effects. Prevention is up to date.    Review of Systems   Constitutional: Negative for fever.   Respiratory: Negative for shortness of breath.    Cardiovascular: Negative for chest pain.   Gastrointestinal: Negative for anal bleeding and blood in stool.   Genitourinary: Negative for dysuria.   Musculoskeletal: Positive for arthralgias and joint swelling.   Neurological: Negative for dizziness and light-headedness.       Objective:      Physical Exam   Constitutional: He is oriented to person, place, and time. He appears well-developed and well-nourished.   HENT:   Head: Normocephalic and atraumatic.   Right Ear: External ear normal.   Left Ear: External ear normal.   Nose: Nose normal.   Mouth/Throat: Oropharynx is clear and moist.   Eyes: Conjunctivae and EOM are normal. Pupils are equal, round, and reactive to light. Right eye exhibits no discharge. Left eye exhibits no discharge. No scleral icterus.   Neck: Normal range of motion. Neck supple. No JVD present. No tracheal deviation present. No thyromegaly present.   Cardiovascular: Normal rate, regular rhythm, normal heart sounds and intact distal pulses.    No murmur heard.  Pulmonary/Chest: Effort normal and breath sounds normal. No respiratory distress. He has no wheezes. He has no rales. He exhibits no tenderness.   Abdominal: Soft. Bowel sounds are normal. He exhibits no distension and no mass.  -Prostatic adenocarcinoma  - [place] grade 3+3=6   -Tumor extent: single focus in one submitted core  -Tumor volume: tumor involves approximately 15% of biopsy volume   -Perineural invasion: not identified     D  Prostate, l lat apex needle biopsy:    -Benign prostatic tissue     E  Prostate, l base needle biopsy:      Prostatic adenocarcinoma  - [place] grade 3+ 4 = 7   -Tumor extent: single focus in one submitted core  -Tumor volume: tumor  involves approximately 20% of biopsy volume   -Perineural invasion: Present     F  Prostate, l mid needle biopsy:      Prostatic adenocarcinoma  - [place] grade 3+ 3=6   -Tumor extent: single focus in one submitted core  -Tumor volume: tumor discontinuously involves 50% of biopsy volume   -Perineural invasion: not identified     G  Prostate, l apex needle biopsy:    -Benign prostatic tissue     H  Prostate, r lat base needle biopsy:      Prostatic adenocarcinoma  - Felipe grade 3+ 4 = 7   -Tumor extent: single focus in one submitted core  -Tumor volume: tumor involves approximately 20% of biopsy volume   -Perineural invasion: not identified     I  Prostate, r lat mid needle biopsy:    -Benign prostatic tissue     J  Prostate, r lat apex needle biopsy:    -Benign prostatic tissue     K  Prostate, r base needle biopsy:      Prostatic adenocarcinoma  - [place] grade 3+ 3=6   -Tumor extent: single focus in one submitted core  -Tumor volume: tumor discontinuously involves 50% of biopsy volume   -Perineural invasion: not identified     L  Prostate, r mid needle biopsy:    -Benign prostatic tissue     M   Prostate, r apex needle biopsy:    -Benign prostatic tissue with mild acute & chronic inflammation        8/3/2020 Initial Diagnosis    Prostate cancer [facility])           Past Medical History:   Diagnosis Date    BPH with obstruction/lower urinary tract symptoms 2015    Elevated PSA 2012    Frequency of micturition 2015    Gallstone     Hypercholesteremia 2013    Incomplete There is no tenderness. There is no rebound and no guarding.   Musculoskeletal: Normal range of motion. He exhibits no edema.   Right knee with midline scar      Lymphadenopathy:     He has no cervical adenopathy.   Neurological: He is alert and oriented to person, place, and time. He has normal reflexes. He displays normal reflexes. No cranial nerve deficit. He exhibits normal muscle tone. Coordination normal.   Skin: Skin is warm and dry.   Psychiatric: He has a normal mood and affect. His behavior is normal. Judgment and thought content normal.       Assessment:       1. Type 2 diabetes mellitus with chronic kidney disease, with long-term current use of insulin, unspecified CKD stage    2. Essential hypertension    3. Hyperlipidemia, unspecified hyperlipidemia type    4. Benign non-nodular prostatic hyperplasia without lower urinary tract symptoms        Plan:   Erasmo PEGUERO was seen today for follow-up.    Diagnoses and all orders for this visit:    Type 2 diabetes mellitus with chronic kidney disease, with long-term current use of insulin, unspecified CKD stage  -     insulin glargine (LANTUS SOLOSTAR) 100 unit/mL (3 mL) InPn pen; Inject 35 Units into the skin every evening.  -     Hemoglobin A1c; Future  -     Comprehensive metabolic panel; Future  -     Lipid panel; Future    Essential hypertension  -     Comprehensive metabolic panel; Future    Hyperlipidemia, unspecified hyperlipidemia type  -     Comprehensive metabolic panel; Future  -     Lipid panel; Future    Benign non-nodular prostatic hyperplasia without lower urinary tract symptoms    continue same. See orders   Labs [facility] 6 months     No Follow-up on file.       emptying of bladder 2015    Microhematuria 2016    Nocturia 2012    Poor urinary stream 2015    Prostate cancer [facility])      Past Surgical History:   Procedure Laterality Date    CATARACT EXTRACTION Right 2010    GALLBLADDER SURGERY  12/10/2015    PROSTATE BIOPSY  2011       Family History   Problem Relation Age of Onset    Alzheimer's disease Mother     Dementia Mother     Heart attack Father     Prostate cancer Father        Social History   Social History     Substance and Sexual Activity   Alcohol Use Yes    Comment: occasionally     Social History     Substance and Sexual Activity   Drug Use No     Social History     Tobacco Use   Smoking Status Never Smoker   Smokeless Tobacco Never Used         Meds/Allergies     Current Outpatient Medications:     aspirin 81 MG tablet, Take 81 mg by mouth daily, Disp: , Rfl:     bimatoprost (LUMIGAN) 0 03 % ophthalmic drops, Administer 1 drop to the right eye daily at bedtime , Disp: , Rfl:     COMBIGAN 0 2-0 5 %, INSTILL 1 DROP INTO RIGHT EYE TWICE A DAY, Disp: , Rfl: 6    Cyanocobalamin 1000 MCG CAPS, Take 1,000 mcg by mouth daily, Disp: , Rfl:     rosuvastatin (CRESTOR) 10 MG tablet, TAKE 1 TABLET BY MOUTH EVERY DAY, Disp: 90 tablet, Rfl: 3    Saw Palmetto 160 MG CAPS, Take 450 mg by mouth 2 (two) times a day , Disp: , Rfl:     sildenafil (VIAGRA) 100 mg tablet, Take 1 tablet (100 mg total) by mouth daily as needed for erectile dysfunction, Disp: 30 tablet, Rfl: 1    LORazepam (ATIVAN) 2 mg tablet, Take 1 tablet (2 mg total) by mouth once for 1 dose Bring to appointment for day of procedure, Disp: 1 tablet, Rfl: 0  No Known Allergies      Review of Systems   Constitutional: Negative for activity change, appetite change and unexpected weight change  HENT: Negative for mouth sores, sore throat, trouble swallowing and voice change  Eyes: Negative for redness and visual disturbance  Respiratory: Negative for cough and shortness of breath  Cardiovascular: Negative for chest pain, palpitations and leg swelling  Gastrointestinal: Negative for abdominal distention, abdominal pain, anal bleeding, constipation, diarrhea and rectal pain  Endocrine: Negative  Genitourinary: Positive for frequency and urgency  Negative for difficulty urinating, dysuria, flank pain and hematuria  Musculoskeletal: Negative  Allergic/Immunologic: Negative  Neurological: Negative  Hematological: Negative  Psychiatric/Behavioral: Negative  OBJECTIVE:   /60   Pulse 71   Temp 97 5 °F (36 4 °C) (Temporal)   Resp 18   Ht 5' 9" (1 753 m)   Wt 93 2 kg (205 lb 7 5 oz)   SpO2 99%   BMI 30 34 kg/m²   Pain Assessment:  0  Performance Status: Karnofsky: 100 - Fully active, able to carry on all pre-disease performed without restriction    Physical Exam   Constitutional: He is oriented to person, place, and time  HENT:   Head: Normocephalic and atraumatic  Eyes: Pupils are equal, round, and reactive to light  Conjunctivae are normal    Neck: Normal range of motion  Neck supple  Cardiovascular: Normal rate, regular rhythm and normal heart sounds  Pulmonary/Chest: Breath sounds normal    Abdominal: Soft  Normal appearance  He exhibits no mass  There is no abdominal tenderness  Genitourinary:    Genitourinary Comments: Digital rectal examination reveals an enlarge prostate gland irregular but no masses or nodules  Musculoskeletal: Normal range of motion  General: No swelling or tenderness  Lymphadenopathy:     He has no cervical adenopathy  Neurological: He is alert and oriented to person, place, and time  He displays no weakness  Coordination normal    Skin: Skin is warm  No lesion and no rash noted  No erythema     Psychiatric: His behavior is normal  Mood, judgment and thought content normal           RESULTS  Lab Results    Chemistry        Component Value Date/Time    K 4 4 06/12/2019 1006     06/12/2019 1006    CO2 31 06/12/2019 1006    BUN 15 06/12/2019 1006    CREATININE 1 09 06/12/2019 1006        Component Value Date/Time    CALCIUM 9 4 06/12/2019 1006    ALKPHOS 78 06/12/2019 1006    ALKPHOS 78 06/12/2019 1006    AST 14 06/12/2019 1006    AST 14 06/12/2019 1006    ALT 14 06/12/2019 1006    ALT 14 06/12/2019 1006            Lab Results   Component Value Date    WBC 8 5 02/05/2019    HGB 15 3 02/05/2019    HCT 44 9 02/05/2019    MCV 97 2 02/05/2019     02/05/2019         Imaging Studies  No results found  Pathology:  [place] 7 adenocarcinoma prostate  ASSESSMENT  1  Prostate cancer [facility])  Ambulatory referral to Radiation Oncology     Cancer Staging  No matching staging information was found for the patient  PLAN/DISCUSSION  No orders of the defined types were placed in this encounter  Carson Sanchez is a 68y o  year old male with stage IIB [place] score 3+4=7 adenocarcinoma prostate intermediate risk  We discussed with patient and his wife the standard course radiation therapy IMRT 44 treatments total dose 79 2 Gy  Inform him side effects during treatment possible few episodes of loose bowel and more likely frequency, urgency and urinary flow restriction in view of his enlarged prostate gland  We discussed short-term ADT  Hopefully may also help reduce size and bulk of the prostate gland  He definitely will need placement of fiducial markers and depending on insurance coverage also space oar  He is made aware that treatments will not start until 8 weeks after the hormone injection  We will schedule him for CT treatment planning simulation about 2 weeks before start of treatment and after the fiducial markers are in place  Patient expresses understanding and agreement to the treatment plan  He will be notified of the dates  Jcarlos Gamboa MD  8/6/2020,11:36 AM      Portions of the record may have been created with voice recognition software    Occasional wrong word or "sound a like" substitutions may have occurred due to the inherent limitations of voice recognition software  Read the chart carefully and recognize, using context, where substitutions have occurred

## 2020-08-06 NOTE — TELEPHONE ENCOUNTER
Patient seen by Dr Shima Rivera today for radiation consult, he is requesting fiducial markers and SpaceOAR  He would also like to know Dr Jennie Gonzalez opinion regarding ADT  Dr Shima Rivera did discuss possible ADT with patient and he was agreeable if Dr Jairo Goodman thinks it is warrented

## 2020-08-11 ENCOUNTER — OFFICE VISIT (OUTPATIENT)
Dept: FAMILY MEDICINE CLINIC | Facility: CLINIC | Age: 80
End: 2020-08-11
Payer: COMMERCIAL

## 2020-08-11 VITALS
OXYGEN SATURATION: 96 % | SYSTOLIC BLOOD PRESSURE: 112 MMHG | HEART RATE: 66 BPM | BODY MASS INDEX: 29.2 KG/M2 | WEIGHT: 204 LBS | TEMPERATURE: 96.7 F | DIASTOLIC BLOOD PRESSURE: 64 MMHG | HEIGHT: 70 IN

## 2020-08-11 DIAGNOSIS — Z00.00 MEDICARE ANNUAL WELLNESS VISIT, SUBSEQUENT: Primary | ICD-10-CM

## 2020-08-11 DIAGNOSIS — R03.0 BLOOD PRESSURE ELEVATED WITHOUT HISTORY OF HTN: ICD-10-CM

## 2020-08-11 DIAGNOSIS — R79.89 ABNORMAL LFTS (LIVER FUNCTION TESTS): ICD-10-CM

## 2020-08-11 DIAGNOSIS — E78.00 PURE HYPERCHOLESTEROLEMIA: ICD-10-CM

## 2020-08-11 DIAGNOSIS — D18.03 LIVER HEMANGIOMA: ICD-10-CM

## 2020-08-11 DIAGNOSIS — C61 PROSTATE CANCER (HCC): ICD-10-CM

## 2020-08-11 PROCEDURE — 3008F BODY MASS INDEX DOCD: CPT | Performed by: FAMILY MEDICINE

## 2020-08-11 PROCEDURE — 1125F AMNT PAIN NOTED PAIN PRSNT: CPT | Performed by: FAMILY MEDICINE

## 2020-08-11 PROCEDURE — G0439 PPPS, SUBSEQ VISIT: HCPCS | Performed by: FAMILY MEDICINE

## 2020-08-11 PROCEDURE — 99214 OFFICE O/P EST MOD 30 MIN: CPT | Performed by: FAMILY MEDICINE

## 2020-08-11 PROCEDURE — 4040F PNEUMOC VAC/ADMIN/RCVD: CPT | Performed by: FAMILY MEDICINE

## 2020-08-11 PROCEDURE — 1036F TOBACCO NON-USER: CPT | Performed by: FAMILY MEDICINE

## 2020-08-11 PROCEDURE — 1170F FXNL STATUS ASSESSED: CPT | Performed by: FAMILY MEDICINE

## 2020-08-11 PROCEDURE — 1160F RVW MEDS BY RX/DR IN RCRD: CPT | Performed by: FAMILY MEDICINE

## 2020-08-11 RX ORDER — BIMATOPROST 0.01 %
DROPS OPHTHALMIC (EYE)
COMMUNITY
Start: 2020-08-01 | End: 2020-12-09 | Stop reason: SDUPTHER

## 2020-08-11 NOTE — PROGRESS NOTES
Assessment/Plan:       No problem-specific Assessment & Plan notes found for this encounter  Diagnoses and all orders for this visit:    Medicare annual wellness visit, subsequent    Prostate cancer [facility])  Comments:   Radiology consult on 08/06 noted    Liver hemangioma  Comments:  Check liver ultrasound with next office visit  Patient stated now he has to go for radiation therapy for his prostate cancer    Blood pressure elevated without history of HTN  Comments:  Blood pressure today is    Pure hypercholesterolemia  Comments: To follow with low-fat diet  Orders:  -     Lipid Panel with Direct LDL reflex; Future    Abnormal LFTs (liver function tests)  Comments:  Stable bilirubin  Rest of the liver function test is normal    Other orders  -     Lumigan 0 01 % ophthalmic drops; INSTILL 1 DROP IN [facility] EYE DAILY        Patient Instructions   Pt to follow with test results      Orders Placed This Encounter   Procedures    Lipid Panel with Direct LDL reflex     This is a patient instruction: This test requires patient fasting for 10-12 hours or longer  Drinking of black coffee or black tea is acceptable  Standing Status:   Future     Standing Expiration Date:   8/11/2021         Subjective:     Patient ID: Rich Zheng is a 68 y o  male      HPI   Elevated PSA  Patient stated he did follow with Urology  He had MRI of the prostate with was chest assist for cancer  He had biopsy and he was diagnosed with cancer  He was seen by oncology Radiology and he recommend to go through radiation  Patient feels well overall  Abnormal liver function test   Denied abdominal pain, jaundice nausea or vomiting  Elevated blood pressure  Denied headache flushing or dizziness  Hyperlipidemia admit to regular fat intake  Does not exercise  Denied chest pain  Liver hemangioma  Denied abdominal pain  Test results    Lab done on June 16 and July 27 discussed result with patient    Review of Systems   Constitutional: Negative for appetite change and fatigue  HENT: Negative for ear pain, tinnitus, trouble swallowing and voice change  Eyes: Negative for photophobia, pain and visual disturbance  Respiratory: Negative for cough, chest tightness and wheezing  Cardiovascular: Negative for chest pain, palpitations and leg swelling  Gastrointestinal: Negative for abdominal distention, abdominal pain, anal bleeding, constipation, diarrhea, nausea and rectal pain  Endocrine: Negative for cold intolerance, heat intolerance, polydipsia and polyuria  Genitourinary: Negative for decreased urine volume, difficulty urinating, dysuria, flank pain, frequency, hematuria and urgency  Musculoskeletal: Negative for arthralgias, back pain, gait problem, myalgias and neck pain  Skin: Negative for pallor and rash  Allergic/Immunologic: Negative for immunocompromised state  Neurological: Negative for dizziness, seizures, syncope and speech difficulty  Hematological: Negative for adenopathy  Does not bruise/bleed easily  Psychiatric/Behavioral: Negative for agitation, confusion and hallucinations  The patient is not nervous/anxious  Objective:     Physical Exam  Constitutional:       General: He is not in acute distress  Appearance: Normal appearance  He is well-developed  HENT:      Head: Normocephalic  Eyes:      General: No scleral icterus  Right eye: No discharge  Left eye: No discharge  Pupils: Pupils are equal, round, and reactive to light  Neck:      Musculoskeletal: Neck supple  Thyroid: No thyromegaly  Vascular: No carotid bruit or JVD  Cardiovascular:      Rate and Rhythm: Normal rate and regular rhythm  Heart sounds: Normal heart sounds  No murmur  No gallop  Pulmonary:      Effort: Pulmonary effort is normal       Breath sounds: Normal breath sounds  Abdominal:      General: Bowel sounds are normal  There is no distension  Palpations: Abdomen is soft  There is no mass  Tenderness: There is no abdominal tenderness  There is no guarding or rebound  Musculoskeletal: Normal range of motion  General: No swelling or tenderness  Right lower leg: No edema  Left lower leg: No edema  Lymphadenopathy:      Cervical: No cervical adenopathy  Skin:     Findings: No rash  Neurological:      General: No focal deficit present  Mental Status: He is alert and oriented to person, place, and time  Cranial Nerves: No cranial nerve deficit  Motor: No abnormal muscle tone  Coordination: Coordination normal       Gait: Gait normal    Psychiatric:         Mood and Affect: Mood normal          Behavior: Behavior normal          Thought Content: Thought content normal      BMI Counseling: Body mass index is 29 2 kg/m²  The BMI is above normal  Nutrition recommendations include decreasing portion sizes

## 2020-08-11 NOTE — PROGRESS NOTES
[facility] is here for his Subsequent Wellness visit  Health Risk Assessment:   Patient rates overall health as very good  Patient feels that their physical health rating is same  Eyesight was rated as same  Hearing was rated as same  Patient feels that their emotional and mental health rating is same  Pain experienced in the last 7 days has been none  Patient states that he has experienced no weight loss or gain in last 6 months  Depression Screening:   PHQ-2 Score: 0      Fall Risk Screening: In the past year, patient has experienced: no history of falling in past year      Home Safety:  Patient does not have trouble with stairs inside or outside of their home  Patient has working smoke alarms and has working carbon monoxide detector  Home safety hazards include: none  Nutrition:   Current diet is Regular  Medications:   Patient is currently taking over-the-counter supplements  OTC medications include: see medication list  Patient is able to manage medications  Activities of Daily Living (ADLs)/Instrumental Activities of Daily Living (IADLs):   Walk and transfer into and out of bed and chair?: Yes  Dress and groom yourself?: Yes    Bathe or shower yourself?: Yes    Feed yourself?  Yes  Do your laundry/housekeeping?: Yes  Manage your money, pay your bills and track your expenses?: Yes  Make your own meals?: Yes    Do your own shopping?: Yes    Previous Hospitalizations:   Any hospitalizations or ED visits within the last 12 months?: No      Advance Care Planning:   Living will: Yes    Advanced directive: Yes    Five wishes given: Yes      Cognitive Screening:   Provider or family/friend/caregiver concerned regarding cognition?: No    PREVENTIVE SCREENINGS      Cardiovascular Screening:    General: History Lipid Disorder and Screening Current      Diabetes Screening:     General: Screening Current      Colorectal Cancer Screening:     General: Risks and Benefits Discussed and Patient Declines Prostate Cancer Screening:    General: History Prostate Cancer and Screening Current      Osteoporosis Screening:    General: Risks and Benefits Discussed and Patient Declines      Abdominal Aortic Aneurysm (AAA) Screening:        General: Screening Not Indicated      Lung Cancer Screening:     General: Screening Not Indicated      Hepatitis C Screening:    General: Risks and Benefits Discussed and Patient Declines    Other Counseling Topics:   Alcohol use counseling, car/seat belt/driving safety, skin self-exam, sunscreen and regular weightbearing exercise and calcium and vitamin D intake

## 2020-08-11 NOTE — TELEPHONE ENCOUNTER
Yes-in light of perineural invasion noted I feel he would benefit from a 6 month course of neoadjuvant Lupron

## 2020-08-19 ENCOUNTER — TELEPHONE (OUTPATIENT)
Dept: UROLOGY | Facility: MEDICAL CENTER | Age: 80
End: 2020-08-19

## 2020-08-20 ENCOUNTER — TELEPHONE (OUTPATIENT)
Dept: UROLOGY | Facility: MEDICAL CENTER | Age: 80
End: 2020-08-20

## 2020-08-20 ENCOUNTER — PROCEDURE VISIT (OUTPATIENT)
Dept: UROLOGY | Facility: MEDICAL CENTER | Age: 80
End: 2020-08-20
Payer: COMMERCIAL

## 2020-08-20 VITALS
SYSTOLIC BLOOD PRESSURE: 118 MMHG | BODY MASS INDEX: 29.49 KG/M2 | WEIGHT: 206 LBS | HEIGHT: 70 IN | DIASTOLIC BLOOD PRESSURE: 62 MMHG | TEMPERATURE: 98 F

## 2020-08-20 DIAGNOSIS — C61 PROSTATE CANCER (HCC): Primary | ICD-10-CM

## 2020-08-20 PROCEDURE — 1170F FXNL STATUS ASSESSED: CPT

## 2020-08-20 PROCEDURE — 3008F BODY MASS INDEX DOCD: CPT

## 2020-08-20 PROCEDURE — 96402 CHEMO HORMON ANTINEOPL SQ/IM: CPT

## 2020-08-20 PROCEDURE — 4040F PNEUMOC VAC/ADMIN/RCVD: CPT

## 2020-08-20 PROCEDURE — 1160F RVW MEDS BY RX/DR IN RCRD: CPT

## 2020-08-20 NOTE — TELEPHONE ENCOUNTER
Pt was seen in the office today for administration of 6 month Lupron  Pt stated he is not sure what next steps are going to be  Informed him that I will reach out to our Nurse Navigator in regards to next steps in plan of care  He is aware and will await a phone call

## 2020-08-20 NOTE — PROGRESS NOTES
8/20/2020  Mary Olmstead is a 68 y o  male  [id number]    Diagnosis:  Chief Complaint     Prostate Cancer          Patient presents for 6 month administration of Lupron  managed by Dr Ollie Arora:  Return to the office as scheduled for follow up       Medication administration:    45mg of Lupron given in right gluteal muscle  Pt tolerated well  Band Aid applied to area       Administrations This Visit     leuprolide (LUPRON DEPOT 6 MONTH KIT) IM injection kit 45 mg     Admin Date  08/20/2020 Action  Given Dose  45 mg Route  Intramuscular Administered By  Erica Borrego RN                Vitals:    08/20/20 1330   BP: 118/62   BP Location: Left arm   Patient Position: Sitting   Cuff Size: Adult   Temp: 98 °F (36 7 °C)   Weight: 93 4 kg (206 lb)   Height: 5' 10" (1 778 m)         Erica Borrego RN normal...

## 2020-08-24 NOTE — TELEPHONE ENCOUNTER
Patient called and given instructions for marker placement        Patient will need antibiotic called into [facility] on [address]

## 2020-08-24 NOTE — TELEPHONE ENCOUNTER
Kaylynn Willis  Made him aware that Alisa Lopez is not covered by his insurance and that he will be receiving a call from Dr Jayant Marsh office to schedule fiducial markers only

## 2020-08-24 NOTE — TELEPHONE ENCOUNTER
Lupron given 8/20/20  Fiducial markers to be placed 9/1/20  Please schedule SIM for the week of 10/5/20

## 2020-08-24 NOTE — TELEPHONE ENCOUNTER
Called patient and gave instructions to wife over the phone for the marker placement  Also emailed instructions to patient  Patient will need an injection and also antibiotics called in prior  Told patient to call with any questions

## 2020-08-24 NOTE — TELEPHONE ENCOUNTER
Call placed to patient and spoke with him  He is scheduled on 9/1/2020 with Dr Antonio Ware for marker placement  Pt is aware,    He is asking if there is any specific prep or instructions that he needs to follow prior to this appointment  Will route to appropriate scheduling  team to help answer questions at this time

## 2020-08-28 ENCOUNTER — TELEPHONE (OUTPATIENT)
Dept: UROLOGY | Facility: MEDICAL CENTER | Age: 80
End: 2020-08-28

## 2020-08-28 NOTE — TELEPHONE ENCOUNTER
Patient of Dr Shilo Oliveros  Patient wife calling due to antibiotic not being called into pharmacy  Patient wife states he needs to get script for procedure on Tuesday please advise

## 2020-08-31 DIAGNOSIS — R97.20 ELEVATED PSA: Primary | ICD-10-CM

## 2020-08-31 RX ORDER — CIPROFLOXACIN 500 MG/1
500 TABLET, FILM COATED ORAL EVERY 12 HOURS SCHEDULED
Qty: 6 TABLET | Refills: 0 | Status: SHIPPED | OUTPATIENT
Start: 2020-08-31 | End: 2020-09-03

## 2020-08-31 NOTE — TELEPHONE ENCOUNTER
Spoke to pt's wife and told her antibiotic prescription will be called in to the pharmacy this afternoon per Dr Johana Justin

## 2020-08-31 NOTE — TELEPHONE ENCOUNTER
Patient managed by Rex Gr   Wife is calling to say she has not received the medication for antibiotic for his procedure tomorrow  She would like this sent to [facility] in his chart and get a call back soon as it is done  Stated this is second call

## 2020-08-31 NOTE — TELEPHONE ENCOUNTER
Patient's wife called in regard to instructions on the antibiotic  She reports that he is to have markers  Not a biopsy  The I instructions for the antibiotics report he is having a biopsy  Please call to advise

## 2020-09-01 ENCOUNTER — PROCEDURE VISIT (OUTPATIENT)
Dept: UROLOGY | Facility: MEDICAL CENTER | Age: 80
End: 2020-09-01
Payer: COMMERCIAL

## 2020-09-01 VITALS
HEART RATE: 72 BPM | TEMPERATURE: 96.7 F | SYSTOLIC BLOOD PRESSURE: 144 MMHG | BODY MASS INDEX: 29.2 KG/M2 | HEIGHT: 70 IN | WEIGHT: 204 LBS | DIASTOLIC BLOOD PRESSURE: 78 MMHG

## 2020-09-01 DIAGNOSIS — C61 PROSTATE CANCER (HCC): Primary | ICD-10-CM

## 2020-09-01 PROCEDURE — A4648 IMPLANTABLE TISSUE MARKER: HCPCS | Performed by: UROLOGY

## 2020-09-01 PROCEDURE — 76942 ECHO GUIDE FOR BIOPSY: CPT | Performed by: UROLOGY

## 2020-09-01 PROCEDURE — 96372 THER/PROPH/DIAG INJ SC/IM: CPT | Performed by: UROLOGY

## 2020-09-01 PROCEDURE — 55876 PLACE RT DEVICE/MARKER PROS: CPT | Performed by: UROLOGY

## 2020-09-01 RX ORDER — CEFTRIAXONE 1 G/1
1000 INJECTION, POWDER, FOR SOLUTION INTRAMUSCULAR; INTRAVENOUS EVERY 24 HOURS
Status: SHIPPED | OUTPATIENT
Start: 2020-09-01

## 2020-09-01 NOTE — PROGRESS NOTES
Procedures  GOLD SEED MARKER IMPLANTATION        Preoperative diagnosis:  Prostate cancer    Postoperative diagnosis:  Same    Procedure:  Implantation of gold seed markers    Surgeon: Ata Gonzalez MD, FACS    Anesthesia: Local    Procedure: The patient was brought to the procedure room and identified  He was positioned with his left side down  Xylocaine jelly was instilled into the rectum for anesthesia  The ultrasound probe was inserted  10 mL of 1% xylocaine was infiltrated into each neurovascular bundle of the prostate  Gold seed markers were placed in the left lateral, right apex and right base positions  Position was confirmed on ultrasound  The procedure was completed  The patient tolerated well was sent home in satisfactory condition  There was no blood loss  Impression: Successful placement of gold seed markers    Plan:  Proceed with radiation therapy

## 2020-09-16 ENCOUNTER — TELEPHONE (OUTPATIENT)
Dept: OTHER | Facility: OTHER | Age: 80
End: 2020-09-16

## 2020-10-09 ENCOUNTER — SOCIAL WORK (OUTPATIENT)
Dept: INFUSION CENTER | Facility: CLINIC | Age: 80
End: 2020-10-09

## 2020-10-09 ENCOUNTER — RADIATION THERAPY TREATMENT (OUTPATIENT)
Dept: RADIATION ONCOLOGY | Facility: CLINIC | Age: 80
End: 2020-10-09
Attending: RADIOLOGY
Payer: COMMERCIAL

## 2020-10-09 PROCEDURE — 77334 RADIATION TREATMENT AID(S): CPT | Performed by: RADIOLOGY

## 2020-10-19 PROCEDURE — 77301 RADIOTHERAPY DOSE PLAN IMRT: CPT | Performed by: RADIOLOGY

## 2020-10-19 PROCEDURE — 77300 RADIATION THERAPY DOSE PLAN: CPT | Performed by: RADIOLOGY

## 2020-10-19 PROCEDURE — 77338 DESIGN MLC DEVICE FOR IMRT: CPT | Performed by: RADIOLOGY

## 2020-10-21 ENCOUNTER — APPOINTMENT (OUTPATIENT)
Dept: RADIATION ONCOLOGY | Facility: CLINIC | Age: 80
End: 2020-10-21
Attending: RADIOLOGY
Payer: COMMERCIAL

## 2020-10-21 PROCEDURE — 77417 THER RADIOLOGY PORT IMAGE(S): CPT | Performed by: RADIOLOGY

## 2020-10-22 ENCOUNTER — APPOINTMENT (OUTPATIENT)
Dept: RADIATION ONCOLOGY | Facility: CLINIC | Age: 80
End: 2020-10-22
Attending: RADIOLOGY
Payer: COMMERCIAL

## 2020-10-22 PROCEDURE — 77385 HB NTSTY MODUL RAD TX DLVR SMPL: CPT | Performed by: RADIOLOGY

## 2020-10-23 ENCOUNTER — APPOINTMENT (OUTPATIENT)
Dept: RADIATION ONCOLOGY | Facility: CLINIC | Age: 80
End: 2020-10-23
Attending: RADIOLOGY
Payer: COMMERCIAL

## 2020-10-23 ENCOUNTER — APPOINTMENT (OUTPATIENT)
Dept: RADIATION ONCOLOGY | Facility: CLINIC | Age: 80
End: 2020-10-23
Payer: COMMERCIAL

## 2020-10-23 DIAGNOSIS — C61 PROSTATE CANCER (HCC): Primary | ICD-10-CM

## 2020-10-23 PROCEDURE — 77385 HB NTSTY MODUL RAD TX DLVR SMPL: CPT | Performed by: RADIOLOGY

## 2020-10-26 ENCOUNTER — APPOINTMENT (OUTPATIENT)
Dept: RADIATION ONCOLOGY | Facility: CLINIC | Age: 80
End: 2020-10-26
Attending: RADIOLOGY
Payer: COMMERCIAL

## 2020-10-26 PROCEDURE — 77385 HB NTSTY MODUL RAD TX DLVR SMPL: CPT | Performed by: RADIOLOGY

## 2020-10-27 ENCOUNTER — APPOINTMENT (OUTPATIENT)
Dept: RADIATION ONCOLOGY | Facility: CLINIC | Age: 80
End: 2020-10-27
Attending: RADIOLOGY
Payer: COMMERCIAL

## 2020-10-27 PROCEDURE — 77385 HB NTSTY MODUL RAD TX DLVR SMPL: CPT | Performed by: RADIOLOGY

## 2020-10-28 ENCOUNTER — APPOINTMENT (OUTPATIENT)
Dept: RADIATION ONCOLOGY | Facility: CLINIC | Age: 80
End: 2020-10-28
Attending: RADIOLOGY
Payer: COMMERCIAL

## 2020-10-28 PROCEDURE — 77385 HB NTSTY MODUL RAD TX DLVR SMPL: CPT | Performed by: RADIOLOGY

## 2020-10-28 PROCEDURE — 77336 RADIATION PHYSICS CONSULT: CPT | Performed by: RADIOLOGY

## 2020-10-29 ENCOUNTER — APPOINTMENT (OUTPATIENT)
Dept: RADIATION ONCOLOGY | Facility: CLINIC | Age: 80
End: 2020-10-29
Attending: RADIOLOGY
Payer: COMMERCIAL

## 2020-10-29 PROCEDURE — 77417 THER RADIOLOGY PORT IMAGE(S): CPT | Performed by: RADIOLOGY

## 2020-10-29 PROCEDURE — 77385 HB NTSTY MODUL RAD TX DLVR SMPL: CPT | Performed by: RADIOLOGY

## 2020-10-30 ENCOUNTER — APPOINTMENT (OUTPATIENT)
Dept: RADIATION ONCOLOGY | Facility: CLINIC | Age: 80
End: 2020-10-30
Payer: COMMERCIAL

## 2020-10-30 ENCOUNTER — APPOINTMENT (OUTPATIENT)
Dept: RADIATION ONCOLOGY | Facility: CLINIC | Age: 80
End: 2020-10-30
Attending: RADIOLOGY
Payer: COMMERCIAL

## 2020-10-30 PROCEDURE — 77385 HB NTSTY MODUL RAD TX DLVR SMPL: CPT | Performed by: RADIOLOGY

## 2020-11-02 ENCOUNTER — APPOINTMENT (OUTPATIENT)
Dept: RADIATION ONCOLOGY | Facility: CLINIC | Age: 80
End: 2020-11-02
Attending: RADIOLOGY
Payer: COMMERCIAL

## 2020-11-02 PROCEDURE — 77385 HB NTSTY MODUL RAD TX DLVR SMPL: CPT | Performed by: RADIOLOGY

## 2020-11-03 ENCOUNTER — APPOINTMENT (OUTPATIENT)
Dept: RADIATION ONCOLOGY | Facility: CLINIC | Age: 80
End: 2020-11-03
Attending: RADIOLOGY
Payer: COMMERCIAL

## 2020-11-03 LAB
BASOPHILS # BLD AUTO: 29 CELLS/UL (ref 0–200)
BASOPHILS NFR BLD AUTO: 0.7 %
EOSINOPHIL # BLD AUTO: 160 CELLS/UL (ref 15–500)
EOSINOPHIL NFR BLD AUTO: 3.9 %
ERYTHROCYTE [DISTWIDTH] IN BLOOD BY AUTOMATED COUNT: 12.7 % (ref 11–15)
HCT VFR BLD AUTO: 40.3 % (ref 38.5–50)
HGB BLD-MCNC: 13.4 G/DL (ref 13.2–17.1)
LYMPHOCYTES # BLD AUTO: 923 CELLS/UL (ref 850–3900)
LYMPHOCYTES NFR BLD AUTO: 22.5 %
MCH RBC QN AUTO: 32.4 PG (ref 27–33)
MCHC RBC AUTO-ENTMCNC: 33.3 G/DL (ref 32–36)
MCV RBC AUTO: 97.3 FL (ref 80–100)
MONOCYTES # BLD AUTO: 262 CELLS/UL (ref 200–950)
MONOCYTES NFR BLD AUTO: 6.4 %
NEUTROPHILS # BLD AUTO: 2727 CELLS/UL (ref 1500–7800)
NEUTROPHILS NFR BLD AUTO: 66.5 %
PLATELET # BLD AUTO: 290 THOUSAND/UL (ref 140–400)
PMV BLD REES-ECKER: 10.3 FL (ref 7.5–12.5)
PSA SERPL-MCNC: 1.3 NG/ML
RBC # BLD AUTO: 4.14 MILLION/UL (ref 4.2–5.8)
WBC # BLD AUTO: 4.1 THOUSAND/UL (ref 3.8–10.8)

## 2020-11-03 PROCEDURE — 77338 DESIGN MLC DEVICE FOR IMRT: CPT | Performed by: RADIOLOGY

## 2020-11-03 PROCEDURE — 77300 RADIATION THERAPY DOSE PLAN: CPT | Performed by: RADIOLOGY

## 2020-11-03 PROCEDURE — 77385 HB NTSTY MODUL RAD TX DLVR SMPL: CPT | Performed by: RADIOLOGY

## 2020-11-04 ENCOUNTER — APPOINTMENT (OUTPATIENT)
Dept: RADIATION ONCOLOGY | Facility: CLINIC | Age: 80
End: 2020-11-04
Attending: RADIOLOGY
Payer: COMMERCIAL

## 2020-11-04 PROCEDURE — 77336 RADIATION PHYSICS CONSULT: CPT | Performed by: RADIOLOGY

## 2020-11-04 PROCEDURE — 77385 HB NTSTY MODUL RAD TX DLVR SMPL: CPT | Performed by: RADIOLOGY

## 2020-11-05 ENCOUNTER — APPOINTMENT (OUTPATIENT)
Dept: RADIATION ONCOLOGY | Facility: CLINIC | Age: 80
End: 2020-11-05
Attending: RADIOLOGY
Payer: COMMERCIAL

## 2020-11-05 PROCEDURE — 77385 HB NTSTY MODUL RAD TX DLVR SMPL: CPT | Performed by: RADIOLOGY

## 2020-11-06 ENCOUNTER — APPOINTMENT (OUTPATIENT)
Dept: RADIATION ONCOLOGY | Facility: CLINIC | Age: 80
End: 2020-11-06
Attending: RADIOLOGY
Payer: COMMERCIAL

## 2020-11-06 PROCEDURE — 77385 HB NTSTY MODUL RAD TX DLVR SMPL: CPT | Performed by: RADIOLOGY

## 2020-11-09 ENCOUNTER — APPOINTMENT (OUTPATIENT)
Dept: RADIATION ONCOLOGY | Facility: CLINIC | Age: 80
End: 2020-11-09
Attending: RADIOLOGY
Payer: COMMERCIAL

## 2020-11-09 PROCEDURE — 77385 HB NTSTY MODUL RAD TX DLVR SMPL: CPT | Performed by: RADIOLOGY

## 2020-11-10 ENCOUNTER — APPOINTMENT (OUTPATIENT)
Dept: RADIATION ONCOLOGY | Facility: CLINIC | Age: 80
End: 2020-11-10
Attending: RADIOLOGY
Payer: COMMERCIAL

## 2020-11-10 PROCEDURE — 77385 HB NTSTY MODUL RAD TX DLVR SMPL: CPT | Performed by: RADIOLOGY

## 2020-11-11 ENCOUNTER — APPOINTMENT (OUTPATIENT)
Dept: RADIATION ONCOLOGY | Facility: CLINIC | Age: 80
End: 2020-11-11
Attending: RADIOLOGY
Payer: COMMERCIAL

## 2020-11-11 PROCEDURE — 77385 HB NTSTY MODUL RAD TX DLVR SMPL: CPT | Performed by: RADIOLOGY

## 2020-11-11 PROCEDURE — 77336 RADIATION PHYSICS CONSULT: CPT | Performed by: RADIOLOGY

## 2020-11-12 ENCOUNTER — APPOINTMENT (OUTPATIENT)
Dept: RADIATION ONCOLOGY | Facility: CLINIC | Age: 80
End: 2020-11-12
Attending: RADIOLOGY
Payer: COMMERCIAL

## 2020-11-12 PROCEDURE — 77385 HB NTSTY MODUL RAD TX DLVR SMPL: CPT | Performed by: RADIOLOGY

## 2020-11-13 ENCOUNTER — APPOINTMENT (OUTPATIENT)
Dept: RADIATION ONCOLOGY | Facility: CLINIC | Age: 80
End: 2020-11-13
Attending: RADIOLOGY
Payer: COMMERCIAL

## 2020-11-13 PROCEDURE — 77385 HB NTSTY MODUL RAD TX DLVR SMPL: CPT | Performed by: RADIOLOGY

## 2020-11-16 ENCOUNTER — APPOINTMENT (OUTPATIENT)
Dept: RADIATION ONCOLOGY | Facility: CLINIC | Age: 80
End: 2020-11-16
Attending: RADIOLOGY
Payer: COMMERCIAL

## 2020-11-16 PROCEDURE — 77385 HB NTSTY MODUL RAD TX DLVR SMPL: CPT | Performed by: RADIOLOGY

## 2020-11-17 ENCOUNTER — APPOINTMENT (OUTPATIENT)
Dept: RADIATION ONCOLOGY | Facility: CLINIC | Age: 80
End: 2020-11-17
Attending: RADIOLOGY
Payer: COMMERCIAL

## 2020-11-17 LAB
BASOPHILS # BLD AUTO: 31 CELLS/UL (ref 0–200)
BASOPHILS NFR BLD AUTO: 0.7 %
EOSINOPHIL # BLD AUTO: 189 CELLS/UL (ref 15–500)
EOSINOPHIL NFR BLD AUTO: 4.3 %
ERYTHROCYTE [DISTWIDTH] IN BLOOD BY AUTOMATED COUNT: 13.3 % (ref 11–15)
HCT VFR BLD AUTO: 39.3 % (ref 38.5–50)
HGB BLD-MCNC: 13.2 G/DL (ref 13.2–17.1)
LYMPHOCYTES # BLD AUTO: 616 CELLS/UL (ref 850–3900)
LYMPHOCYTES NFR BLD AUTO: 14 %
MCH RBC QN AUTO: 32.5 PG (ref 27–33)
MCHC RBC AUTO-ENTMCNC: 33.6 G/DL (ref 32–36)
MCV RBC AUTO: 96.8 FL (ref 80–100)
MONOCYTES # BLD AUTO: 497 CELLS/UL (ref 200–950)
MONOCYTES NFR BLD AUTO: 11.3 %
NEUTROPHILS # BLD AUTO: 3067 CELLS/UL (ref 1500–7800)
NEUTROPHILS NFR BLD AUTO: 69.7 %
PLATELET # BLD AUTO: 241 THOUSAND/UL (ref 140–400)
PMV BLD REES-ECKER: 9.8 FL (ref 7.5–12.5)
RBC # BLD AUTO: 4.06 MILLION/UL (ref 4.2–5.8)
WBC # BLD AUTO: 4.4 THOUSAND/UL (ref 3.8–10.8)

## 2020-11-17 PROCEDURE — 77385 HB NTSTY MODUL RAD TX DLVR SMPL: CPT | Performed by: RADIOLOGY

## 2020-11-18 ENCOUNTER — APPOINTMENT (OUTPATIENT)
Dept: RADIATION ONCOLOGY | Facility: CLINIC | Age: 80
End: 2020-11-18
Attending: RADIOLOGY
Payer: COMMERCIAL

## 2020-11-18 PROCEDURE — 77385 HB NTSTY MODUL RAD TX DLVR SMPL: CPT | Performed by: RADIOLOGY

## 2020-11-18 PROCEDURE — 77336 RADIATION PHYSICS CONSULT: CPT | Performed by: RADIOLOGY

## 2020-11-19 ENCOUNTER — APPOINTMENT (OUTPATIENT)
Dept: RADIATION ONCOLOGY | Facility: CLINIC | Age: 80
End: 2020-11-19
Attending: RADIOLOGY
Payer: COMMERCIAL

## 2020-11-19 PROCEDURE — 77385 HB NTSTY MODUL RAD TX DLVR SMPL: CPT | Performed by: RADIOLOGY

## 2020-11-20 ENCOUNTER — APPOINTMENT (OUTPATIENT)
Dept: RADIATION ONCOLOGY | Facility: CLINIC | Age: 80
End: 2020-11-20
Attending: RADIOLOGY
Payer: COMMERCIAL

## 2020-11-20 ENCOUNTER — APPOINTMENT (OUTPATIENT)
Dept: RADIATION ONCOLOGY | Facility: CLINIC | Age: 80
End: 2020-11-20
Payer: COMMERCIAL

## 2020-11-20 PROCEDURE — 77385 HB NTSTY MODUL RAD TX DLVR SMPL: CPT | Performed by: RADIOLOGY

## 2020-11-22 ENCOUNTER — APPOINTMENT (OUTPATIENT)
Dept: RADIATION ONCOLOGY | Facility: CLINIC | Age: 80
End: 2020-11-22
Attending: RADIOLOGY
Payer: COMMERCIAL

## 2020-11-22 PROCEDURE — 77385 HB NTSTY MODUL RAD TX DLVR SMPL: CPT | Performed by: RADIOLOGY

## 2020-11-23 ENCOUNTER — APPOINTMENT (OUTPATIENT)
Dept: RADIATION ONCOLOGY | Facility: CLINIC | Age: 80
End: 2020-11-23
Attending: RADIOLOGY
Payer: COMMERCIAL

## 2020-11-23 PROCEDURE — 77385 HB NTSTY MODUL RAD TX DLVR SMPL: CPT | Performed by: RADIOLOGY

## 2020-11-24 ENCOUNTER — APPOINTMENT (OUTPATIENT)
Dept: RADIATION ONCOLOGY | Facility: CLINIC | Age: 80
End: 2020-11-24
Attending: RADIOLOGY
Payer: COMMERCIAL

## 2020-11-24 PROCEDURE — 77336 RADIATION PHYSICS CONSULT: CPT | Performed by: RADIOLOGY

## 2020-11-24 PROCEDURE — 77385 HB NTSTY MODUL RAD TX DLVR SMPL: CPT | Performed by: RADIOLOGY

## 2020-11-25 ENCOUNTER — APPOINTMENT (OUTPATIENT)
Dept: RADIATION ONCOLOGY | Facility: CLINIC | Age: 80
End: 2020-11-25
Attending: RADIOLOGY
Payer: COMMERCIAL

## 2020-11-25 PROCEDURE — 77385 HB NTSTY MODUL RAD TX DLVR SMPL: CPT | Performed by: RADIOLOGY

## 2020-11-27 ENCOUNTER — APPOINTMENT (OUTPATIENT)
Dept: RADIATION ONCOLOGY | Facility: CLINIC | Age: 80
End: 2020-11-27
Payer: COMMERCIAL

## 2020-11-30 ENCOUNTER — APPOINTMENT (OUTPATIENT)
Dept: RADIATION ONCOLOGY | Facility: CLINIC | Age: 80
End: 2020-11-30
Attending: RADIOLOGY
Payer: COMMERCIAL

## 2020-11-30 PROCEDURE — 77385 HB NTSTY MODUL RAD TX DLVR SMPL: CPT | Performed by: RADIOLOGY

## 2020-12-01 ENCOUNTER — APPOINTMENT (OUTPATIENT)
Dept: RADIATION ONCOLOGY | Facility: CLINIC | Age: 80
End: 2020-12-01
Attending: RADIOLOGY
Payer: COMMERCIAL

## 2020-12-01 LAB
BASOPHILS # BLD AUTO: 32 CELLS/UL (ref 0–200)
BASOPHILS NFR BLD AUTO: 0.7 %
EOSINOPHIL # BLD AUTO: 313 CELLS/UL (ref 15–500)
EOSINOPHIL NFR BLD AUTO: 6.8 %
ERYTHROCYTE [DISTWIDTH] IN BLOOD BY AUTOMATED COUNT: 14 % (ref 11–15)
HCT VFR BLD AUTO: 39.4 % (ref 38.5–50)
HGB BLD-MCNC: 13.1 G/DL (ref 13.2–17.1)
LYMPHOCYTES # BLD AUTO: 474 CELLS/UL (ref 850–3900)
LYMPHOCYTES NFR BLD AUTO: 10.3 %
MCH RBC QN AUTO: 32.7 PG (ref 27–33)
MCHC RBC AUTO-ENTMCNC: 33.2 G/DL (ref 32–36)
MCV RBC AUTO: 98.3 FL (ref 80–100)
MONOCYTES # BLD AUTO: 524 CELLS/UL (ref 200–950)
MONOCYTES NFR BLD AUTO: 11.4 %
NEUTROPHILS # BLD AUTO: 3257 CELLS/UL (ref 1500–7800)
NEUTROPHILS NFR BLD AUTO: 70.8 %
PLATELET # BLD AUTO: 238 THOUSAND/UL (ref 140–400)
PMV BLD REES-ECKER: 9.6 FL (ref 7.5–12.5)
RBC # BLD AUTO: 4.01 MILLION/UL (ref 4.2–5.8)
WBC # BLD AUTO: 4.6 THOUSAND/UL (ref 3.8–10.8)

## 2020-12-01 PROCEDURE — 77417 THER RADIOLOGY PORT IMAGE(S): CPT | Performed by: RADIOLOGY

## 2020-12-01 PROCEDURE — 77385 HB NTSTY MODUL RAD TX DLVR SMPL: CPT | Performed by: RADIOLOGY

## 2020-12-02 ENCOUNTER — APPOINTMENT (OUTPATIENT)
Dept: RADIATION ONCOLOGY | Facility: CLINIC | Age: 80
End: 2020-12-02
Attending: RADIOLOGY
Payer: COMMERCIAL

## 2020-12-02 PROCEDURE — 77385 HB NTSTY MODUL RAD TX DLVR SMPL: CPT | Performed by: RADIOLOGY

## 2020-12-03 ENCOUNTER — APPOINTMENT (OUTPATIENT)
Dept: RADIATION ONCOLOGY | Facility: CLINIC | Age: 80
End: 2020-12-03
Attending: RADIOLOGY
Payer: COMMERCIAL

## 2020-12-03 PROCEDURE — 77336 RADIATION PHYSICS CONSULT: CPT | Performed by: RADIOLOGY

## 2020-12-03 PROCEDURE — 77385 HB NTSTY MODUL RAD TX DLVR SMPL: CPT | Performed by: RADIOLOGY

## 2020-12-04 ENCOUNTER — APPOINTMENT (OUTPATIENT)
Dept: RADIATION ONCOLOGY | Facility: CLINIC | Age: 80
End: 2020-12-04
Attending: RADIOLOGY
Payer: COMMERCIAL

## 2020-12-04 ENCOUNTER — APPOINTMENT (OUTPATIENT)
Dept: RADIATION ONCOLOGY | Facility: CLINIC | Age: 80
End: 2020-12-04
Payer: COMMERCIAL

## 2020-12-04 PROCEDURE — 77385 HB NTSTY MODUL RAD TX DLVR SMPL: CPT | Performed by: RADIOLOGY

## 2020-12-07 ENCOUNTER — APPOINTMENT (OUTPATIENT)
Dept: RADIATION ONCOLOGY | Facility: CLINIC | Age: 80
End: 2020-12-07
Attending: RADIOLOGY
Payer: COMMERCIAL

## 2020-12-07 PROCEDURE — 77385 HB NTSTY MODUL RAD TX DLVR SMPL: CPT | Performed by: RADIOLOGY

## 2020-12-08 ENCOUNTER — APPOINTMENT (OUTPATIENT)
Dept: RADIATION ONCOLOGY | Facility: CLINIC | Age: 80
End: 2020-12-08
Attending: RADIOLOGY
Payer: COMMERCIAL

## 2020-12-08 PROCEDURE — 77385 HB NTSTY MODUL RAD TX DLVR SMPL: CPT | Performed by: RADIOLOGY

## 2020-12-08 PROCEDURE — 77417 THER RADIOLOGY PORT IMAGE(S): CPT | Performed by: RADIOLOGY

## 2020-12-09 ENCOUNTER — APPOINTMENT (OUTPATIENT)
Dept: RADIATION ONCOLOGY | Facility: CLINIC | Age: 80
End: 2020-12-09
Attending: RADIOLOGY
Payer: COMMERCIAL

## 2020-12-09 ENCOUNTER — OFFICE VISIT (OUTPATIENT)
Dept: FAMILY MEDICINE CLINIC | Facility: CLINIC | Age: 80
End: 2020-12-09
Payer: COMMERCIAL

## 2020-12-09 VITALS
BODY MASS INDEX: 29.01 KG/M2 | HEART RATE: 70 BPM | WEIGHT: 202.6 LBS | HEIGHT: 70 IN | DIASTOLIC BLOOD PRESSURE: 68 MMHG | OXYGEN SATURATION: 97 % | SYSTOLIC BLOOD PRESSURE: 140 MMHG | TEMPERATURE: 97.8 F

## 2020-12-09 DIAGNOSIS — C61 PROSTATE CANCER (HCC): ICD-10-CM

## 2020-12-09 DIAGNOSIS — E78.6 LOW HDL (UNDER 40): ICD-10-CM

## 2020-12-09 DIAGNOSIS — D18.03 LIVER HEMANGIOMA: Primary | ICD-10-CM

## 2020-12-09 DIAGNOSIS — Z28.20 IMMUNIZATION NOT CARRIED OUT BECAUSE OF PATIENT DECISION: ICD-10-CM

## 2020-12-09 DIAGNOSIS — D75.89 MACROCYTIC: ICD-10-CM

## 2020-12-09 DIAGNOSIS — D64.9 ANEMIA, UNSPECIFIED TYPE: ICD-10-CM

## 2020-12-09 DIAGNOSIS — E78.2 MIXED HYPERLIPIDEMIA: ICD-10-CM

## 2020-12-09 PROCEDURE — 99214 OFFICE O/P EST MOD 30 MIN: CPT | Performed by: FAMILY MEDICINE

## 2020-12-09 PROCEDURE — 77385 HB NTSTY MODUL RAD TX DLVR SMPL: CPT | Performed by: RADIOLOGY

## 2020-12-10 ENCOUNTER — APPOINTMENT (OUTPATIENT)
Dept: RADIATION ONCOLOGY | Facility: CLINIC | Age: 80
End: 2020-12-10
Attending: RADIOLOGY
Payer: COMMERCIAL

## 2020-12-10 PROCEDURE — 77385 HB NTSTY MODUL RAD TX DLVR SMPL: CPT | Performed by: RADIOLOGY

## 2020-12-10 PROCEDURE — 77336 RADIATION PHYSICS CONSULT: CPT | Performed by: RADIOLOGY

## 2020-12-11 ENCOUNTER — APPOINTMENT (OUTPATIENT)
Dept: RADIATION ONCOLOGY | Facility: CLINIC | Age: 80
End: 2020-12-11
Attending: RADIOLOGY
Payer: COMMERCIAL

## 2020-12-11 ENCOUNTER — APPOINTMENT (OUTPATIENT)
Dept: RADIATION ONCOLOGY | Facility: CLINIC | Age: 80
End: 2020-12-11
Payer: COMMERCIAL

## 2020-12-11 PROBLEM — Z28.20 IMMUNIZATION NOT CARRIED OUT BECAUSE OF PATIENT DECISION: Status: ACTIVE | Noted: 2020-12-11

## 2020-12-11 PROBLEM — E78.6 LOW HDL (UNDER 40): Status: ACTIVE | Noted: 2020-12-11

## 2020-12-11 PROCEDURE — 77385 HB NTSTY MODUL RAD TX DLVR SMPL: CPT | Performed by: RADIOLOGY

## 2020-12-14 ENCOUNTER — APPOINTMENT (OUTPATIENT)
Dept: RADIATION ONCOLOGY | Facility: CLINIC | Age: 80
End: 2020-12-14
Attending: RADIOLOGY
Payer: COMMERCIAL

## 2020-12-14 PROCEDURE — 77385 HB NTSTY MODUL RAD TX DLVR SMPL: CPT | Performed by: RADIOLOGY

## 2020-12-15 ENCOUNTER — APPOINTMENT (OUTPATIENT)
Dept: RADIATION ONCOLOGY | Facility: CLINIC | Age: 80
End: 2020-12-15
Attending: RADIOLOGY
Payer: COMMERCIAL

## 2020-12-15 PROCEDURE — 77385 HB NTSTY MODUL RAD TX DLVR SMPL: CPT | Performed by: RADIOLOGY

## 2020-12-15 PROCEDURE — 77417 THER RADIOLOGY PORT IMAGE(S): CPT | Performed by: RADIOLOGY

## 2020-12-16 ENCOUNTER — APPOINTMENT (OUTPATIENT)
Dept: RADIATION ONCOLOGY | Facility: CLINIC | Age: 80
End: 2020-12-16
Attending: RADIOLOGY
Payer: COMMERCIAL

## 2020-12-16 PROCEDURE — 77385 HB NTSTY MODUL RAD TX DLVR SMPL: CPT | Performed by: RADIOLOGY

## 2020-12-17 ENCOUNTER — APPOINTMENT (OUTPATIENT)
Dept: RADIATION ONCOLOGY | Facility: CLINIC | Age: 80
End: 2020-12-17
Attending: RADIOLOGY
Payer: COMMERCIAL

## 2020-12-18 ENCOUNTER — APPOINTMENT (OUTPATIENT)
Dept: RADIATION ONCOLOGY | Facility: CLINIC | Age: 80
End: 2020-12-18
Attending: RADIOLOGY
Payer: COMMERCIAL

## 2020-12-18 ENCOUNTER — APPOINTMENT (OUTPATIENT)
Dept: RADIATION ONCOLOGY | Facility: CLINIC | Age: 80
End: 2020-12-18
Payer: COMMERCIAL

## 2020-12-18 PROCEDURE — 77336 RADIATION PHYSICS CONSULT: CPT | Performed by: RADIOLOGY

## 2020-12-18 PROCEDURE — 77385 HB NTSTY MODUL RAD TX DLVR SMPL: CPT | Performed by: RADIOLOGY

## 2020-12-21 ENCOUNTER — APPOINTMENT (OUTPATIENT)
Dept: RADIATION ONCOLOGY | Facility: CLINIC | Age: 80
End: 2020-12-21
Attending: RADIOLOGY
Payer: COMMERCIAL

## 2020-12-21 PROCEDURE — 77385 HB NTSTY MODUL RAD TX DLVR SMPL: CPT | Performed by: RADIOLOGY

## 2020-12-22 ENCOUNTER — APPOINTMENT (OUTPATIENT)
Dept: RADIATION ONCOLOGY | Facility: CLINIC | Age: 80
End: 2020-12-22
Attending: RADIOLOGY
Payer: COMMERCIAL

## 2020-12-22 PROCEDURE — 77385 HB NTSTY MODUL RAD TX DLVR SMPL: CPT | Performed by: RADIOLOGY

## 2020-12-23 ENCOUNTER — APPOINTMENT (OUTPATIENT)
Dept: RADIATION ONCOLOGY | Facility: CLINIC | Age: 80
End: 2020-12-23
Attending: RADIOLOGY
Payer: COMMERCIAL

## 2020-12-23 ENCOUNTER — APPOINTMENT (OUTPATIENT)
Dept: RADIATION ONCOLOGY | Facility: CLINIC | Age: 80
End: 2020-12-23
Payer: COMMERCIAL

## 2020-12-23 PROCEDURE — 77385 HB NTSTY MODUL RAD TX DLVR SMPL: CPT | Performed by: RADIOLOGY

## 2020-12-24 ENCOUNTER — APPOINTMENT (OUTPATIENT)
Dept: RADIATION ONCOLOGY | Facility: CLINIC | Age: 80
End: 2020-12-24
Payer: COMMERCIAL

## 2020-12-24 ENCOUNTER — APPOINTMENT (OUTPATIENT)
Dept: RADIATION ONCOLOGY | Facility: CLINIC | Age: 80
End: 2020-12-24
Attending: RADIOLOGY
Payer: COMMERCIAL

## 2020-12-28 ENCOUNTER — APPOINTMENT (OUTPATIENT)
Dept: RADIATION ONCOLOGY | Facility: CLINIC | Age: 80
End: 2020-12-28
Attending: RADIOLOGY
Payer: COMMERCIAL

## 2020-12-28 ENCOUNTER — APPOINTMENT (OUTPATIENT)
Dept: RADIATION ONCOLOGY | Facility: CLINIC | Age: 80
End: 2020-12-28
Payer: COMMERCIAL

## 2020-12-28 PROCEDURE — 77336 RADIATION PHYSICS CONSULT: CPT | Performed by: RADIOLOGY

## 2020-12-28 PROCEDURE — 77385 HB NTSTY MODUL RAD TX DLVR SMPL: CPT | Performed by: RADIOLOGY

## 2021-01-14 LAB
BASOPHILS # BLD AUTO: 39 CELLS/UL (ref 0–200)
BASOPHILS NFR BLD AUTO: 0.9 %
EOSINOPHIL # BLD AUTO: 198 CELLS/UL (ref 15–500)
EOSINOPHIL NFR BLD AUTO: 4.6 %
ERYTHROCYTE [DISTWIDTH] IN BLOOD BY AUTOMATED COUNT: 13.6 % (ref 11–15)
HCT VFR BLD AUTO: 41.1 % (ref 38.5–50)
HGB BLD-MCNC: 13.7 G/DL (ref 13.2–17.1)
LYMPHOCYTES # BLD AUTO: 636 CELLS/UL (ref 850–3900)
LYMPHOCYTES NFR BLD AUTO: 14.8 %
MCH RBC QN AUTO: 33.1 PG (ref 27–33)
MCHC RBC AUTO-ENTMCNC: 33.3 G/DL (ref 32–36)
MCV RBC AUTO: 99.3 FL (ref 80–100)
MONOCYTES # BLD AUTO: 486 CELLS/UL (ref 200–950)
MONOCYTES NFR BLD AUTO: 11.3 %
NEUTROPHILS # BLD AUTO: 2941 CELLS/UL (ref 1500–7800)
NEUTROPHILS NFR BLD AUTO: 68.4 %
PLATELET # BLD AUTO: 252 THOUSAND/UL (ref 140–400)
PMV BLD REES-ECKER: 9.6 FL (ref 7.5–12.5)
RBC # BLD AUTO: 4.14 MILLION/UL (ref 4.2–5.8)
WBC # BLD AUTO: 4.3 THOUSAND/UL (ref 3.8–10.8)

## 2021-01-19 ENCOUNTER — OFFICE VISIT (OUTPATIENT)
Dept: UROLOGY | Facility: MEDICAL CENTER | Age: 81
End: 2021-01-19
Payer: COMMERCIAL

## 2021-01-19 VITALS
DIASTOLIC BLOOD PRESSURE: 78 MMHG | WEIGHT: 202 LBS | HEIGHT: 70 IN | SYSTOLIC BLOOD PRESSURE: 136 MMHG | BODY MASS INDEX: 28.92 KG/M2

## 2021-01-19 DIAGNOSIS — C61 PROSTATE CANCER (HCC): Primary | ICD-10-CM

## 2021-01-19 PROCEDURE — 99214 OFFICE O/P EST MOD 30 MIN: CPT | Performed by: UROLOGY

## 2021-01-19 RX ORDER — BIMATOPROST 0.01 %
DROPS OPHTHALMIC (EYE)
COMMUNITY
Start: 2021-01-02 | End: 2022-04-12

## 2021-01-19 NOTE — PROGRESS NOTES
Assessment/Plan:    Prostate cancer [facility])  The patient is doing well post radiation therapy and neoadjuvant hormonal therapy  He continues to resolve the acute effects of the radiation therapy  He does have a follow-up video visit with radiation therapy in the near future  He will return in 6 months and we will check a PSA and testosterone level prior to his next visit  Diagnoses and all orders for this visit:    Prostate cancer ([facility] [address] )  -     PSA Total, Diagnostic; Future  -     Testosterone; Future    Other orders  -     Lumigan 0 01 % ophthalmic drops; INSTILL 1 DROP IN [facility] EYE DAILY          Subjective:      Patient ID: Justin Marroquin is a 68 y o  male  Chief complaint:  Prostate cancer      HPI:  74-year-old male recently completed radiation therapy on December 28th for clinically localized prostate cancer  He received 1 dose of neoadjuvant Lupron in August   He notes that he tolerated radiation therapy well  He continues to have occasional hot flashes from the Lupron  He is voiding adequately and notes that his voiding pattern continues to improve as he resolves the cute effects of the radiation therapy  He has been getting up 3 times per night but last night noted he only got up twice  There is no gross hematuria, dysuria or symptoms of infection  He has no incontinence or rectal bleeding  His diarrhea has improved  There is no new back or bone pain  The following portions of the patient's history were reviewed and updated as appropriate: allergies, current medications, past family history, past medical history, past social history, past surgical history and problem list     Review of Systems   Constitutional: Negative for chills, diaphoresis, fatigue, fever and unexpected weight change  HENT: Negative  Eyes: Negative  Respiratory: Negative  Cardiovascular: Negative      Gastrointestinal:        Flatulence   Endocrine:        Hot flashes from lupron   Genitourinary:        See HPI   Musculoskeletal: Negative  Skin: Negative  Allergic/Immunologic: Negative  Neurological: Negative  Hematological: Negative  Psychiatric/Behavioral: Negative  AUA SYMPTOM SCORE      Most Recent Value   AUA SYMPTOM SCORE   How often have you had a sensation of not emptying your bladder completely after you finished urinating? 3   How often have you had to urinate again less than two hours after you finished urinating? 3   How often have you found you stopped and started again several times when you urinate? 3   How often have you found it difficult to postpone urination? 1   How often have you had a weak urinary stream?  2   How often have you had to push or strain to begin urination? 1   How many times did you most typically get up to urinate from the time you went to bed at night until the time you got up in the morning? 3   Quality of Life: If you were to spend the rest of your life with your urinary condition just the way it is now, how would you feel about that?  3   AUA SYMPTOM SCORE  16        Objective:      /78 (BP Location: Left arm, Patient Position: Sitting, Cuff Size: Adult)   Ht 5' 10" (1 778 m)   Wt 91 6 kg (202 lb)   BMI 28 98 kg/m²          Physical Exam  Vitals signs reviewed  Constitutional:       General: He is not in acute distress  Appearance: Normal appearance  He is well-developed  He is not ill-appearing, toxic-appearing or diaphoretic  HENT:      Head: Normocephalic and atraumatic  Eyes:      General: No scleral icterus  Conjunctiva/sclera: Conjunctivae normal    Neck:      Musculoskeletal: Neck supple  Cardiovascular:      Rate and Rhythm: Normal rate  Pulmonary:      Effort: Pulmonary effort is normal    Abdominal:      General: Bowel sounds are normal  There is no distension  Palpations: Abdomen is soft  There is no mass  Tenderness: There is no abdominal tenderness   There is no right CVA tenderness, left CVA tenderness, guarding or rebound  Hernia: A hernia is present  Comments: Right inguinal hernia  Bladder decompressed   Genitourinary:     Penis: Normal  No phimosis or hypospadias  Scrotum/Testes: Normal          Right: Mass not present  Left: Mass not present  Musculoskeletal: Normal range of motion  Skin:     General: Skin is warm and dry  Neurological:      General: No focal deficit present  Mental Status: He is alert and oriented to person, place, and time  Psychiatric:         Mood and Affect: Mood normal          Behavior: Behavior normal          Thought Content:  Thought content normal          Judgment: Judgment normal

## 2021-01-19 NOTE — ASSESSMENT & PLAN NOTE
The patient is doing well post radiation therapy and neoadjuvant hormonal therapy  He continues to resolve the acute effects of the radiation therapy  He does have a follow-up video visit with radiation therapy in the near future  He will return in 6 months and we will check a PSA and testosterone level prior to his next visit

## 2021-01-19 NOTE — PATIENT INSTRUCTIONS
Prostate Cancer, Ambulatory Care   GENERAL INFORMATION:   Prostate cancer, Ambulatory Care develops in the male sex gland that helps make semen (prostate)  It is about the size of a walnut and wraps around the urethra  The urethra is the tube that carries urine from the bladder to the end of the penis  In most cases, prostate cancer is slow growing  Common symptoms include the following:   · Trouble starting or stopping the flow of urine    · Feeling the need to urinate often, especially at night    · Pain or a burning feeling when you urinate or ejaculate semen    · Trouble having an erection    · Blood in your urine or semen    · Not being able to urinate at all    · Pain or stiffness in your lower back, hips, or upper thighs  Seek immediate care for the following symptoms:   · Chest pain when you take a deep breath or cough    · Coughing up blood    · Suddenly feeling lightheaded and short of breath    · Your leg feels warm, tender, and painful  It may look swollen and red  Treatment for prostate cancer: If you have early stage cancer, your healthcare provider may recommend that you have frequent tests and regular follow-up visits to watch for changes  You may also need any of the following:  · Hormone therapy  is medicine used to decrease testosterone (male hormone) levels  · Radiation therapy  is used to kill cancer cells with high-energy x-rays beams  You may receive radiation therapy from outside your body or from small beads or rods placed inside your prostate  · Surgery  may be needed, depending on the stage of the cancer  Part or all of your prostate may be removed  You may also need to have some lymph nodes taken out  This may help keep the cancer from spreading to other parts of your body  Manage your prostate cancer:   · Eat a variety of healthy foods  Healthy foods include fruits, vegetables, whole-grain breads, low-fat dairy products, beans, lean meats, and fish   Your healthcare provider may also recommend changes to the amounts of calcium and vitamin D you have each day  · Manage your weight  Obesity may increase your risk for problems from prostate cancer  Limit or do not have high-calorie foods or drinks  · Exercise as directed  Exercise may help you recover after treatment and may help prevent your prostate cancer from returning  Exercise can also help you manage your weight  Try to get at least 30 minutes of exercise 5 days a week, such as walking  · Do not smoke  If you smoke, it is never too late to quit  Smoking increases the risk that your prostate cancer will return after treatment  Smoking also increases your risk for other types of cancer  Ask your healthcare provider for information if you need help quitting  · Limit or do not drink alcohol  If you drink, limit alcohol to 2 drinks per day  A drink is 12 ounces of beer, 1½ ounces of liquor, or 5 ounces of wine  Follow up with your urologist or oncologist as directed:  Write down your questions so you remember to ask them during your visits  CARE AGREEMENT:   You have the right to help plan your care  Learn about your health condition and how it may be treated  Discuss treatment options with your caregivers to decide what care you want to receive  You always have the right to refuse treatment  The above information is an  only  It is not intended as medical advice for individual conditions or treatments  Talk to your doctor, nurse or pharmacist before following any medical regimen to see if it is safe and effective for you  © 2014 [address] is for End User's use only and may not be sold, redistributed or otherwise used for commercial purposes  All illustrations and images included in CareNotes® are the copyrighted property of [employer] M [employer]  or Timothy Frausto

## 2021-01-20 ENCOUNTER — TELEPHONE (OUTPATIENT)
Dept: UROLOGY | Facility: MEDICAL CENTER | Age: 81
End: 2021-01-20

## 2021-01-20 NOTE — TELEPHONE ENCOUNTER
Will pt be continuing Lupron? Last dose 8/20/20, next would be due 2/4/21 or after  If continuing, would need to scheduled for nv  Please advise

## 2021-04-07 ENCOUNTER — OFFICE VISIT (OUTPATIENT)
Dept: FAMILY MEDICINE CLINIC | Facility: CLINIC | Age: 81
End: 2021-04-07
Payer: COMMERCIAL

## 2021-04-07 VITALS
BODY MASS INDEX: 29.6 KG/M2 | WEIGHT: 206.8 LBS | DIASTOLIC BLOOD PRESSURE: 60 MMHG | HEART RATE: 66 BPM | HEIGHT: 70 IN | TEMPERATURE: 97.3 F | OXYGEN SATURATION: 96 % | SYSTOLIC BLOOD PRESSURE: 118 MMHG

## 2021-04-07 DIAGNOSIS — C61 PROSTATE CANCER (HCC): ICD-10-CM

## 2021-04-07 DIAGNOSIS — D18.03 LIVER HEMANGIOMA: ICD-10-CM

## 2021-04-07 DIAGNOSIS — Z28.20 IMMUNIZATION NOT CARRIED OUT BECAUSE OF PATIENT DECISION: ICD-10-CM

## 2021-04-07 DIAGNOSIS — D75.89 MACROCYTIC: ICD-10-CM

## 2021-04-07 DIAGNOSIS — E78.2 MIXED HYPERLIPIDEMIA: ICD-10-CM

## 2021-04-07 DIAGNOSIS — M79.672 LEFT FOOT PAIN: Primary | ICD-10-CM

## 2021-04-07 DIAGNOSIS — E78.6 LOW HDL (UNDER 40): ICD-10-CM

## 2021-04-07 PROCEDURE — 99214 OFFICE O/P EST MOD 30 MIN: CPT | Performed by: FAMILY MEDICINE

## 2021-04-07 NOTE — PROGRESS NOTES
Assessment/Plan:       No problem-specific Assessment & Plan notes found for this encounter  Diagnoses and all orders for this visit:    Left foot pain  Comments:  Almost resolved completely advised patient to call if any further concern most likely has plantar fasciitis which is resolving    Low HDL (under 40)  Comments:  Advised to walk half an hour daily  Orders:  -     Lipid Panel with Direct LDL reflex; Future    Liver hemangioma  Comments:  check liver us ,order exist   He said right now he is not going to have the ultrasound done but he will consider it later  Orders:  -     Comprehensive metabolic panel; Future    Mixed hyperlipidemia  Comments: To follow with low-fat diet    Macrocytic  Comments:  Resolved    Prostate cancer ([facility] [address] )  Comments: Follow with Urology and Radiology Oncology  Doing well    Immunization not carried out because of patient decision  Comments:  pneumovax        Patient Instructions   Follow up with test results      Orders Placed This Encounter   Procedures    Comprehensive metabolic panel     This is a patient instruction: Patient fasting for 8 hours or longer recommended  Standing Status:   Future     Standing Expiration Date:   4/7/2022    Lipid Panel with Direct LDL reflex     This is a patient instruction: This test requires patient fasting for 10-12 hours or longer  Drinking of black coffee or black tea is acceptable  Standing Status:   Future     Standing Expiration Date:   4/7/2022         Subjective:     Patient ID: Bg Hopper is a 80 y o  male      HPI  Foot pain  Left foot  Patient stated 2 weeks ago he felt pain at the bottom of his foot at the proximal at the end of his healed proximally  Felt it in the morning when he stepped on it it was really sharp  For 1 day and then every day start to improve until almost completely gun  He denied stepping on any foreign body  Denied swelling, mass, redness of the area  Denied injury to the foot      Liver hemangioma ,denied abdominal pain  Prostate cancer  He was treated by Lupron injection which developed hot flashes after that and also by radiology but he is doing overall well and he still follow with Urology and Radiology Oncology  Hyperlipidemia admit to regular fat intake denied side effect with Crestor  Obesity  He does not watch his diet or exercise      Test results   Lab done on 1-13-21 noted  Liver us not done    Review of Systems   Constitutional: Negative for activity change, appetite change, chills, fatigue, fever and unexpected weight change  HENT: Negative for congestion, ear discharge, ear pain, hearing loss, nosebleeds, rhinorrhea, sinus pressure, sore throat, tinnitus, trouble swallowing and voice change  Eyes: Negative for photophobia, pain and visual disturbance  Respiratory: Negative for cough, chest tightness, shortness of breath and wheezing  Cardiovascular: Negative for chest pain, palpitations and leg swelling  Gastrointestinal: Negative for abdominal pain, anal bleeding, blood in stool, constipation, diarrhea, nausea and vomiting  Endocrine: Negative for cold intolerance, heat intolerance, polydipsia and polyuria  Genitourinary: Negative for dysuria, frequency, hematuria and urgency  Musculoskeletal: Negative for arthralgias, back pain, gait problem, joint swelling, myalgias and neck pain  Skin: Negative for rash  Neurological: Negative for dizziness, tremors, seizures, syncope, weakness, light-headedness and headaches  Hematological: Negative for adenopathy  Does not bruise/bleed easily  Psychiatric/Behavioral: Negative for agitation, behavioral problems, confusion, dysphoric mood, hallucinations and sleep disturbance  The patient is not nervous/anxious  Objective:     Physical Exam  Constitutional:       General: He is not in acute distress  Appearance: Normal appearance  He is well-developed  He is obese  He is not ill-appearing     HENT:      Head: Normocephalic  Eyes:      General: No scleral icterus  Right eye: No discharge  Left eye: No discharge  Pupils: Pupils are equal, round, and reactive to light  Neck:      Musculoskeletal: Neck supple  Thyroid: No thyromegaly  Vascular: No carotid bruit or JVD  Cardiovascular:      Rate and Rhythm: Normal rate and regular rhythm  Pulses:           Dorsalis pedis pulses are 3+ on the right side and 3+ on the left side  Posterior tibial pulses are 3+ on the right side and 3+ on the left side  Heart sounds: Normal heart sounds  No murmur  No gallop  Pulmonary:      Effort: Pulmonary effort is normal       Breath sounds: Normal breath sounds  Abdominal:      General: Bowel sounds are normal  There is no distension  Palpations: Abdomen is soft  There is no mass  Tenderness: There is no abdominal tenderness  There is no guarding or rebound  Musculoskeletal: Normal range of motion  General: No swelling or tenderness  Right lower leg: No edema  Left lower leg: No edema  Comments: Left foot , no tenderness, no mass , no swelling or redness   Lymphadenopathy:      Cervical: No cervical adenopathy  Skin:     Coloration: Skin is not jaundiced or pale  Findings: No bruising or rash  Neurological:      General: No focal deficit present  Mental Status: He is alert and oriented to person, place, and time  Cranial Nerves: No cranial nerve deficit  Motor: No weakness or abnormal muscle tone  Coordination: Coordination normal       Gait: Gait normal    Psychiatric:         Mood and Affect: Mood normal          Behavior: Behavior normal          Thought Content: Thought content normal         BMI Counseling: Body mass index is 29 67 kg/m²  The BMI is above normal  Nutrition recommendations include decreasing portion sizes

## 2021-04-26 DIAGNOSIS — E78.2 MIXED HYPERLIPIDEMIA: ICD-10-CM

## 2021-04-27 RX ORDER — ROSUVASTATIN CALCIUM 10 MG/1
TABLET, COATED ORAL
Qty: 90 TABLET | Refills: 3 | Status: SHIPPED | OUTPATIENT
Start: 2021-04-27 | End: 2022-05-23

## 2021-08-04 ENCOUNTER — RA CDI HCC (OUTPATIENT)
Dept: OTHER | Facility: HOSPITAL | Age: 81
End: 2021-08-04

## 2021-08-04 NOTE — PROGRESS NOTES
[facility]  coding opportunities          Chart reviewed, no opportunity found: CHART REVIEWED, NO OPPORTUNITY FOUND                     Patients insurance company: [employer] (Medicare Advantage and Commercial)

## 2021-08-11 ENCOUNTER — OFFICE VISIT (OUTPATIENT)
Dept: FAMILY MEDICINE CLINIC | Facility: CLINIC | Age: 81
End: 2021-08-11
Payer: COMMERCIAL

## 2021-08-11 VITALS
BODY MASS INDEX: 29.35 KG/M2 | HEIGHT: 70 IN | WEIGHT: 205 LBS | OXYGEN SATURATION: 97 % | HEART RATE: 67 BPM | DIASTOLIC BLOOD PRESSURE: 62 MMHG | SYSTOLIC BLOOD PRESSURE: 119 MMHG | TEMPERATURE: 96.4 F

## 2021-08-11 DIAGNOSIS — D18.03 LIVER HEMANGIOMA: ICD-10-CM

## 2021-08-11 DIAGNOSIS — Z00.00 MEDICARE ANNUAL WELLNESS VISIT, SUBSEQUENT: Primary | ICD-10-CM

## 2021-08-11 DIAGNOSIS — E78.6 LOW HDL (UNDER 40): ICD-10-CM

## 2021-08-11 DIAGNOSIS — E66.3 OVER WEIGHT: ICD-10-CM

## 2021-08-11 DIAGNOSIS — Z23 IMMUNIZATION DUE: ICD-10-CM

## 2021-08-11 DIAGNOSIS — D75.89 MACROCYTOSIS: ICD-10-CM

## 2021-08-11 DIAGNOSIS — E78.2 MIXED HYPERLIPIDEMIA: ICD-10-CM

## 2021-08-11 DIAGNOSIS — C61 PROSTATE CANCER (HCC): ICD-10-CM

## 2021-08-11 PROCEDURE — G0439 PPPS, SUBSEQ VISIT: HCPCS | Performed by: FAMILY MEDICINE

## 2021-08-11 PROCEDURE — 99214 OFFICE O/P EST MOD 30 MIN: CPT | Performed by: FAMILY MEDICINE

## 2021-08-11 NOTE — PATIENT INSTRUCTIONS

## 2021-08-11 NOTE — PROGRESS NOTES
Assessment/Plan:       No problem-specific Assessment & Plan notes found for this encounter  Diagnoses and all orders for this visit:    Medicare annual wellness visit, subsequent    Mixed hyperlipidemia    Liver hemangioma    Low HDL (under 40)    Macrocytosis  Comments:  resolved    Over weight  Comments:  advise to lose weight  Prostate cancer [facility])  Comments:  following with  urology and radiology oncology  Immunization due  Comments:  recommend T dap  Patient Instructions       Medicare Preventive Visit Patient Instructions  Thank you for completing your Welcome to Medicare Visit or Medicare Annual Wellness Visit today  Your next wellness visit will be due in one year (8/12/2022)  The screening/preventive services that you may require over the next 5-10 years are detailed below  Some tests may not apply to you based off risk factors and/or age  Screening tests ordered at today's visit but not completed yet may show as past due  Also, please note that scanned in results may not display below  Preventive Screenings:  Service Recommendations Previous Testing/Comments   Colorectal Cancer Screening  · Colonoscopy    · Fecal Occult Blood Test (FOBT)/Fecal Immunochemical Test (FIT)  · Fecal DNA/Cologuard Test  · Flexible Sigmoidoscopy Age: 54-65 years old   Colonoscopy: every 10 years (May be performed more frequently if at higher risk)  OR  FOBT/FIT: every 1 year  OR  Cologuard: every 3 years  OR  Sigmoidoscopy: every 5 years  Screening may be recommended earlier than age 48 if at higher risk for colorectal cancer  Also, an individualized decision between you and your healthcare provider will decide whether screening between the ages of 74-80 would be appropriate   Colonoscopy: Not on file  FOBT/FIT: Not on file  Cologuard: Not on file  Sigmoidoscopy: Not on file          Prostate Cancer Screening Individualized decision between patient and health care provider in men between ages of 53-78 Medicare will cover every 12 months beginning on the day after your 50th birthday PSA: 1 3 ng/mL           Hepatitis C Screening Once for adults born between 1945 and 1965  More frequently in patients at high risk for Hepatitis C Hep C Antibody: Not on file        Diabetes Screening 1-2 times per year if you're at risk for diabetes or have pre-diabetes Fasting glucose: No results in last 5 years   A1C: No results in last 5 years        Cholesterol Screening Once every 5 years if you don't have a lipid disorder  May order more often based on risk factors  Lipid panel: 06/12/2019           Other Preventive Screenings Covered by Medicare:  1  Abdominal Aortic Aneurysm (AAA) Screening: covered once if your at risk  You're considered to be at risk if you have a family history of AAA or a male between the age of 73-68 who smoking at least 100 cigarettes in your lifetime  2  Lung Cancer Screening: covers low dose CT scan once per year if you meet all of the following conditions: (1) Age 50-69; (2) No signs or symptoms of lung cancer; (3) Current smoker or have quit smoking within the last 15 years; (4) You have a tobacco smoking history of at least 30 pack years (packs per day x number of years you smoked); (5) You get a written order from a healthcare provider  3  Glaucoma Screening: covered annually if you're considered high risk: (1) You have diabetes OR (2) Family history of glaucoma OR (3)  aged 48 and older OR (3)  American aged 72 and older  3  Osteoporosis Screening: covered every 2 years if you meet one of the following conditions: (1) Have a vertebral abnormality; (2) On glucocorticoid therapy for more than 3 months; (3) Have primary hyperparathyroidism; (4) On osteoporosis medications and need to assess response to drug therapy  5  HIV Screening: covered annually if you're between the age of 12-76   Also covered annually if you are younger than 13 and older than 72 with risk factors for HIV infection  For pregnant patients, it is covered up to 3 times per pregnancy  Immunizations:  Immunization Recommendations   Influenza Vaccine Annual influenza vaccination during flu season is recommended for all persons aged >= 6 months who do not have contraindications   Pneumococcal Vaccine (Prevnar and Pneumovax)  * Prevnar = PCV13  * Pneumovax = PPSV23 Adults 25-60 years old: 1-3 doses may be recommended based on certain risk factors  Adults 72 years old: Prevnar (PCV13) vaccine recommended followed by Pneumovax (PPSV23) vaccine  If already received PPSV23 since turning 65, then PCV13 recommended at least one year after PPSV23 dose  Hepatitis B Vaccine 3 dose series if at intermediate or high risk (ex: diabetes, end stage renal disease, liver disease)   Tetanus (Td) Vaccine - COST NOT COVERED BY MEDICARE PART B Following completion of primary series, a booster dose should be given every 10 years to maintain immunity against tetanus  Td may also be given as tetanus wound prophylaxis  Tdap Vaccine - COST NOT COVERED BY MEDICARE PART B Recommended at least once for all adults  For pregnant patients, recommended with each pregnancy  Shingles Vaccine (Shingrix) - COST NOT COVERED BY MEDICARE PART B  2 shot series recommended in those aged 48 and above     Health Maintenance Due:      Topic Date Due    Hepatitis C Screening  Never done     Immunizations Due:      Topic Date Due    Hepatitis A Vaccine (1 of 2 - Risk 2-dose series) Never done    Hepatitis B Vaccine (1 of 3 - Risk 3-dose series) Never done    DTaP,Tdap,and Td Vaccines (1 - Tdap) Never done    Pneumococcal Vaccine: 65+ Years (1 of 2 - PPSV23) 01/22/2020    Influenza Vaccine (1) 09/01/2021     Advance Directives   What are advance directives? Advance directives are legal documents that state your wishes and plans for medical care  These plans are made ahead of time in case you lose your ability to make decisions for yourself  Advance directives can apply to any medical decision, such as the treatments you want, and if you want to donate organs  What are the types of advance directives? There are many types of advance directives, and each state has rules about how to use them  You may choose a combination of any of the following:  · Living will: This is a written record of the treatment you want  You can also choose which treatments you do not want, which to limit, and which to stop at a certain time  This includes surgery, medicine, IV fluid, and tube feedings  · Durable power of  for healthcare [facility]): This is a written record that states who you want to make healthcare choices for you when you are unable to make them for yourself  This person, called a proxy, is usually a family member or a friend  You may choose more than 1 proxy  · Do not resuscitate (DNR) order:  A DNR order is used in case your heart stops beating or you stop breathing  It is a request not to have certain forms of treatment, such as CPR  A DNR order may be included in other types of advance directives  · Medical directive: This covers the care that you want if you are in a coma, near death, or unable to make decisions for yourself  You can list the treatments you want for each condition  Treatment may include pain medicine, surgery, blood transfusions, dialysis, IV or tube feedings, and a ventilator (breathing machine)  · Values history: This document has questions about your views, beliefs, and how you feel and think about life  This information can help others choose the care that you would choose  Why are advance directives important? An advance directive helps you control your care  Although spoken wishes may be used, it is better to have your wishes written down  Spoken wishes can be misunderstood, or not followed  Treatments may be given even if you do not want them   An advance directive may make it easier for your family to make difficult choices about your care  Weight Management   Why it is important to manage your weight:  Being overweight increases your risk of health conditions such as heart disease, high blood pressure, type 2 diabetes, and certain types of cancer  It can also increase your risk for osteoarthritis, sleep apnea, and other respiratory problems  Aim for a slow, steady weight loss  Even a small amount of weight loss can lower your risk of health problems  How to lose weight safely:  A safe and healthy way to lose weight is to eat fewer calories and get regular exercise  You can lose up about 1 pound a week by decreasing the number of calories you eat by 500 calories each day  Healthy meal plan for weight management:  A healthy meal plan includes a variety of foods, contains fewer calories, and helps you stay healthy  A healthy meal plan includes the following:  · Eat whole-grain foods more often  A healthy meal plan should contain fiber  Fiber is the part of grains, fruits, and vegetables that is not broken down by your body  Whole-grain foods are healthy and provide extra fiber in your diet  Some examples of whole-grain foods are whole-wheat breads and pastas, oatmeal, brown rice, and bulgur  · Eat a variety of vegetables every day  Include dark, leafy greens such as spinach, kale, morro greens, and mustard greens  Eat yellow and orange vegetables such as carrots, sweet potatoes, and winter squash  · Eat a variety of fruits every day  Choose fresh or canned fruit (canned in its own juice or light syrup) instead of juice  Fruit juice has very little or no fiber  · Eat low-fat dairy foods  Drink fat-free (skim) milk or 1% milk  Eat fat-free yogurt and low-fat cottage cheese  Try low-fat cheeses such as mozzarella and other reduced-fat cheeses  · Choose meat and other protein foods that are low in fat  Choose beans or other legumes such as split peas or lentils   Choose fish, skinless poultry (chicken or turkey), or lean cuts of red meat (beef or pork)  Before you cook meat or poultry, cut off any visible fat  · Use less fat and oil  Try baking foods instead of frying them  Add less fat, such as margarine, sour cream, regular salad dressing and mayonnaise to foods  Eat fewer high-fat foods  Some examples of high-fat foods include french fries, doughnuts, ice cream, and cakes  · Eat fewer sweets  Limit foods and drinks that are high in sugar  This includes candy, cookies, regular soda, and sweetened drinks  Exercise:  Exercise at least 30 minutes per day on most days of the week  Some examples of exercise include walking, biking, dancing, and swimming  You can also fit in more physical activity by taking the stairs instead of the elevator or parking farther away from stores  Ask your healthcare provider about the best exercise plan for you  © Copyright [employer] 2018 Information is for End User's use only and may not be sold, redistributed or otherwise used for commercial purposes  All illustrations and images included in CareNotes® are the copyrighted property of A D [employer] M , Inc  or [employer]       No orders of the defined types were placed in this encounter  Subjective:     Patient ID: Bre Mullen is a 80 y o  male      HPI   Hyperlipidemia  Taking Crestor a has a regular diet  He does exercise on stationary bike  Prostate cancer  Following with Urology  And Radiology Oncology Doing well  Macrocytosis  Drinks socially  He feels well  Liver hemangioma  Denied abdominal pain or shoulder  Overweight , denied weight gain , trying to  loseweight  Test results  Lab done on July 20th 2021 reviewed with patient  Review of Systems   Constitutional: Negative for appetite change and fatigue  HENT: Negative for ear pain, tinnitus, trouble swallowing and voice change  Eyes: Negative for photophobia, pain and visual disturbance     Respiratory: Negative for cough, chest tightness and wheezing  Cardiovascular: Negative for chest pain, palpitations and leg swelling  Gastrointestinal: Negative for abdominal distention, abdominal pain, anal bleeding, constipation, diarrhea, nausea and rectal pain  Endocrine: Negative for cold intolerance, heat intolerance, polydipsia and polyuria  Genitourinary: Negative for decreased urine volume, difficulty urinating, dysuria, flank pain, frequency, hematuria and urgency  Musculoskeletal: Negative for arthralgias, back pain, gait problem, myalgias and neck pain  Skin: Negative for pallor and rash  Allergic/Immunologic: Negative for immunocompromised state  Neurological: Negative for dizziness, seizures, syncope and speech difficulty  Hematological: Negative for adenopathy  Does not bruise/bleed easily  Psychiatric/Behavioral: Negative for agitation, confusion and hallucinations  The patient is not nervous/anxious  Objective:     Physical Exam  Constitutional:       General: He is not in acute distress  Appearance: Normal appearance  He is well-developed  He is not ill-appearing or diaphoretic  HENT:      Head: Normocephalic  Eyes:      General: No scleral icterus  Right eye: No discharge  Left eye: No discharge  Pupils: Pupils are equal, round, and reactive to light  Neck:      Thyroid: No thyromegaly  Vascular: No carotid bruit or JVD  Cardiovascular:      Rate and Rhythm: Normal rate and regular rhythm  Pulses:           Carotid pulses are 3+ on the right side and 3+ on the left side  Dorsalis pedis pulses are 3+ on the right side and 3+ on the left side  Heart sounds: Normal heart sounds  No murmur heard  No gallop  Pulmonary:      Effort: Pulmonary effort is normal       Breath sounds: Normal breath sounds  Abdominal:      General: Bowel sounds are normal  There is no distension  Palpations: Abdomen is soft  There is no mass  Tenderness:  There is no abdominal tenderness  There is no guarding or rebound  Musculoskeletal:         General: No swelling or tenderness  Normal range of motion  Cervical back: Neck supple  Right lower leg: No edema  Left lower leg: No edema  Lymphadenopathy:      Cervical: No cervical adenopathy  Skin:     Coloration: Skin is not pale  Findings: No rash  Neurological:      General: No focal deficit present  Mental Status: He is alert and oriented to person, place, and time  Cranial Nerves: No cranial nerve deficit  Motor: No abnormal muscle tone  Coordination: Coordination normal       Gait: Gait normal    Psychiatric:         Mood and Affect: Mood normal          Behavior: Behavior normal          Thought Content:  Thought content normal          Judgment: Judgment normal

## 2021-08-11 NOTE — PROGRESS NOTES
Assessment and Plan:     Problem List Items Addressed This Visit     None      Visit Diagnoses     Medicare annual wellness visit, subsequent    -  Primary           Preventive health issues were discussed with patient, and age appropriate screening tests were ordered as noted in patient's After Visit Summary  Personalized health advice and appropriate referrals for health education or preventive services given if needed, as noted in patient's After Visit Summary  History of Present Illness:     Patient presents for Medicare Annual Wellness visit    Patient Care Team:  Sree Llanos MD as PCP - Landy Barber MD as PCP - [facility]  Arthur Chandra MD (Urology)  Michelle Rosa MD (Radiation Oncology)     Problem List:     Patient Active Problem List   Diagnosis    Elevated prostate specific antigen (PSA)    Benign prostatic hyperplasia    Macrocytic    Microscopic hematuria    Liver hemangioma    Pure hypercholesterolemia    Obesity (BMI 30-39  9)    Macrocytosis    Anemia    Chronic cholecystitis with calculus    Chronic nonalcoholic liver disease    Hyperlipidemia    Osteoarthritis    Prostate cancer ([facility] [address] )    Immunization not carried out because of patient decision    Low HDL (under 40)      Past Medical and Surgical History:     Past Medical History:   Diagnosis Date    BPH with obstruction/lower urinary tract symptoms 2015    Elevated PSA 2012    Frequency of micturition 2015    Gallstone     Hypercholesteremia 2013    Incomplete emptying of bladder 2015    Microhematuria 2016    Nocturia 2012    Poor urinary stream 2015    Prostate cancer [facility])      Past Surgical History:   Procedure Laterality Date    CATARACT EXTRACTION Right 2010    GALLBLADDER SURGERY  12/10/2015    PROSTATE BIOPSY  2011      Family History:     Family History   Problem Relation Age of Onset    Alzheimer's disease Mother     Dementia Mother     Heart attack Father  Prostate cancer Father       Social History:     Social History     Socioeconomic History    Marital status: /Civil Union     Spouse name: None    Number of children: None    Years of education: None    Highest education level: None   Occupational History    None   Tobacco Use    Smoking status: Never Smoker    Smokeless tobacco: Never Used   Substance and Sexual Activity    Alcohol use: Yes     Comment: occasionally    Drug use: No    Sexual activity: Yes   Other Topics Concern    None   Social History Narrative    Consumes caffeine, 1 cup/day    Soda     Social Determinants of Health     Financial Resource Strain:     Difficulty of Paying Living Expenses:    Food Insecurity:     Worried About Running Out of Food in the Last Year:     Ran Out of Food in the Last Year:    Transportation Needs:     Lack of Transportation (Medical):      Lack of Transportation (Non-Medical):    Physical Activity:     Days of Exercise per Week:     Minutes of Exercise per Session:    Stress:     Feeling of Stress :    Social Connections:     Frequency of Communication with Friends and Family:     Frequency of Social Gatherings with Friends and Family:     Attends Sikh Services:     Active Member of Clubs or Organizations:     Attends Club or Organization Meetings:     Marital Status:    Intimate Partner Violence:     Fear of Current or Ex-Partner:     Emotionally Abused:     Physically Abused:     Sexually Abused:       Medications and Allergies:     Current Outpatient Medications   Medication Sig Dispense Refill    aspirin 81 MG tablet Take 81 mg by mouth daily      bimatoprost (LUMIGAN) 0 03 % ophthalmic drops Administer 1 drop to the right eye daily at bedtime       COMBIGAN 0 2-0 5 % INSTILL 1 DROP INTO RIGHT EYE TWICE A DAY  6    Cyanocobalamin 1000 MCG CAPS Take 1,000 mcg by mouth daily      Lumigan 0 01 % ophthalmic drops INSTILL 1 DROP IN EACH EYE DAILY      rosuvastatin (CRESTOR) 10 MG tablet TAKE 1 TABLET BY MOUTH EVERY DAY 90 tablet 3    sildenafil (VIAGRA) 100 mg tablet Take 1 tablet (100 mg total) by mouth daily as needed for erectile dysfunction 30 tablet 1     Current Facility-Administered Medications   Medication Dose Route Frequency Provider Last Rate Last Admin    cefTRIAXone (ROCEPHIN) injection 1,000 mg  1,000 mg Intravenous Q24H Deonte York MD   1,000 mg at 09/01/20 1129     No Known Allergies   Immunizations:     Immunization History   Administered Date(s) Administered    Influenza, high dose seasonal 0 7 mL 10/23/2018, 11/27/2019, 11/02/2020    Pneumococcal Conjugate 13-Valent 11/27/2019    SARS-CoV-2 / COVID-19 mRNA IM (Randol Been) 01/11/2021, 02/08/2021      Health Maintenance:         Topic Date Due    Hepatitis C Screening  Never done         Topic Date Due    Hepatitis A Vaccine (1 of 2 - Risk 2-dose series) Never done    Hepatitis B Vaccine (1 of 3 - Risk 3-dose series) Never done    DTaP,Tdap,and Td Vaccines (1 - Tdap) Never done    Pneumococcal Vaccine: 65+ Years (1 of 2 - PPSV23) 01/22/2020    Influenza Vaccine (1) 09/01/2021      Medicare Health Risk Assessment:     /62 (BP Location: Left arm, Patient Position: Sitting, Cuff Size: Standard)   Pulse 67   Temp (!) 96 4 °F (35 8 °C) (Temporal)   Ht 5' 10" (1 778 m)   Wt 93 kg (205 lb)   SpO2 97%   BMI 29 41 kg/m²      Ken Fonseca is here for his Subsequent Wellness visit  Health Risk Assessment:   Patient rates overall health as very good  Patient feels that their physical health rating is same  Patient is very satisfied with their life  Eyesight was rated as same  Hearing was rated as same  Patient feels that their emotional and mental health rating is same  Patients states they are never, rarely angry  Patient states they are never, rarely unusually tired/fatigued  Pain experienced in the last 7 days has been none   Patient states that he has experienced no weight loss or gain in last 6 months  Depression Screening:   PHQ-2 Score: 0      Fall Risk Screening: In the past year, patient has experienced: no history of falling in past year      Home Safety:  Patient does not have trouble with stairs inside or outside of their home  Patient has working smoke alarms and has working carbon monoxide detector  Home safety hazards include: none  Nutrition:   Current diet is Regular and Limited junk food  Medications:   Patient is currently taking over-the-counter supplements  OTC medications include: see medication list  Patient is able to manage medications  Activities of Daily Living (ADLs)/Instrumental Activities of Daily Living (IADLs):   Walk and transfer into and out of bed and chair?: Yes  Dress and groom yourself?: Yes    Bathe or shower yourself?: Yes    Feed yourself?  Yes  Do your laundry/housekeeping?: Yes  Manage your money, pay your bills and track your expenses?: Yes  Make your own meals?: Yes    Do your own shopping?: Yes    Previous Hospitalizations:   Any hospitalizations or ED visits within the last 12 months?: No      Advance Care Planning:   Living will: Yes    Durable POA for healthcare: No    Advanced directive: Yes      Cognitive Screening:   Provider or family/friend/caregiver concerned regarding cognition?: No    PREVENTIVE SCREENINGS      Cardiovascular Screening:    General: History Lipid Disorder and Screening Current      Diabetes Screening:     General: Screening Current      Colorectal Cancer Screening:     General: Patient Declines      Prostate Cancer Screening:    General: History Prostate Cancer, Screening Not Indicated and Screening Current      Osteoporosis Screening:    General: Patient Declines and Risks and Benefits Discussed      Abdominal Aortic Aneurysm (AAA) Screening:        General: Screening Not Indicated      Lung Cancer Screening:     General: Screening Not Indicated      Hepatitis C Screening:    General: Risks and Benefits Discussed and Patient Declines    Screening, Brief Intervention, and Referral to Treatment (SBIRT)    Screening    Typical number of drinks in a week: 2    Single Item Drug Screening:  How often have you used an illegal drug (including marijuana) or a prescription medication for non-medical reasons in the past year? never    Single Item Drug Screen Score: 0  Interpretation: Negative screen for possible drug use disorder    Other Counseling Topics:   Car/seat belt/driving safety, skin self-exam, sunscreen and calcium and vitamin D intake and regular weightbearing exercise         Chato Baker MD

## 2021-08-18 ENCOUNTER — CLINICAL SUPPORT (OUTPATIENT)
Dept: RADIATION ONCOLOGY | Facility: CLINIC | Age: 81
End: 2021-08-18
Attending: RADIOLOGY
Payer: COMMERCIAL

## 2021-08-18 VITALS
BODY MASS INDEX: 29.56 KG/M2 | SYSTOLIC BLOOD PRESSURE: 120 MMHG | OXYGEN SATURATION: 99 % | TEMPERATURE: 97 F | HEART RATE: 70 BPM | WEIGHT: 206.5 LBS | DIASTOLIC BLOOD PRESSURE: 70 MMHG | RESPIRATION RATE: 16 BRPM | HEIGHT: 70 IN

## 2021-08-18 DIAGNOSIS — C61 PROSTATE CANCER (HCC): Primary | ICD-10-CM

## 2021-08-18 PROCEDURE — 99212 OFFICE O/P EST SF 10 MIN: CPT | Performed by: RADIOLOGY

## 2021-08-18 PROCEDURE — G0463 HOSPITAL OUTPT CLINIC VISIT: HCPCS | Performed by: RADIOLOGY

## 2021-08-18 PROCEDURE — 99211 OFF/OP EST MAY X REQ PHY/QHP: CPT | Performed by: RADIOLOGY

## 2021-08-18 NOTE — PROGRESS NOTES
Mulugeta Maciel 1940 is a 80 y o  male       Follow up visit   68year old male presents today for follow up for [place] 7(3+4) prostate cancer  He completed radiation to the prostate on 12/28/2020  Here is here today for an 8 month follow up     8/20/2020- Patient received Lupron injection  Only one dose given  1/19/21-f/u Deion Priscilla  Patient is doing well post neoadjuvant hormonal therapy  Return in six months with PSA prior  PSA Trend  1/10/18 PSA 7 8  2/5/19 PSA 5 7  3/18/20 PSA 9 0  11/2/2020 PSA 1 3  7/20/21- PSA <0 1  [facility]    Testosterone  7/20/21     97L          [facility]        Oncology History   Prostate cancer ([facility] [address] )   6/16/2020 Biopsy    A  Prostate, r apex x5 (MRI Fusion), needle biopsy:      -Prostatic adenocarcinoma  -[place] score: 3+3 = 6  -Tumor extent: tumor involves all five submitted cores  -Tumor volume: tumor involves  10%, 60% , 60,%, 70%, 80% of each core biopsy   -Perineural invasion:  Present, few foci     B  Prostate, l lat base needle biopsy:      -Small focus of adenocarcinoma of the prostate  - [place] grade 3+3 = 6   -Tumor extent: single focus in one submitted core  -Tumor volume: tumor involves approximately 5% of biopsy volume   -Perineural invasion: not identified     C  Prostate, l lat mid needle biopsy:      -Prostatic adenocarcinoma  - [place] grade 3+3=6   -Tumor extent: single focus in one submitted core  -Tumor volume: tumor involves approximately 15% of biopsy volume   -Perineural invasion: not identified     D   Prostate, l lat apex needle biopsy:    -Benign prostatic tissue     E  Prostate, l base needle biopsy:      Prostatic adenocarcinoma  - [place] grade 3+ 4 = 7   -Tumor extent: single focus in one submitted core  -Tumor volume: tumor  involves approximately 20% of biopsy volume   -Perineural invasion: Present     F  Prostate, l mid needle biopsy:      Prostatic adenocarcinoma  - [place] grade 3+ 3=6   -Tumor extent: single focus in one submitted core  -Tumor volume: tumor discontinuously involves 50% of biopsy volume   -Perineural invasion: not identified     G  Prostate, l apex needle biopsy:    -Benign prostatic tissue     H  Prostate, r lat base needle biopsy:      Prostatic adenocarcinoma  - [place] grade 3+ 4 = 7   -Tumor extent: single focus in one submitted core  -Tumor volume: tumor involves approximately 20% of biopsy volume   -Perineural invasion: not identified     I  Prostate, r lat mid needle biopsy:    -Benign prostatic tissue     J  Prostate, r lat apex needle biopsy:    -Benign prostatic tissue     K  Prostate, r base needle biopsy:      Prostatic adenocarcinoma  - [place] grade 3+ 3=6   -Tumor extent: single focus in one submitted core  -Tumor volume: tumor discontinuously involves 50% of biopsy volume   -Perineural invasion: not identified     L  Prostate, r mid needle biopsy:    -Benign prostatic tissue     M  Prostate, r apex needle biopsy:    -Benign prostatic tissue with mild acute & chronic inflammation        8/3/2020 Initial Diagnosis    Prostate cancer ([facility] [address] )     10/22/2020 - 12/28/2020 Radiation    Treatments:  Course: C1    Plan ID Energy Fractions Dose per Fraction (cGy) Dose Correction (cGy) Total Dose Delivered (cGy) Elapsed Days   CD Prost_SV 10X 19 / 19 180 0 3,420 33   Whole Pelvis 10X 25 / 25 180 0 4,500 33      Treatment dates:  10/22/2020 - 12/28/2020           Clinical Trial: no      Health Maintenance   Topic Date Due    Hepatitis C Screening  Never done    Hepatitis A Vaccine (1 of 2 - Risk 2-dose series) Never done    Hepatitis B Vaccine (1 of 3 - Risk 3-dose series) Never done    DTaP,Tdap,and Td Vaccines (1 - Tdap) Never done    Pneumococcal Vaccine: 65+ Years (1 of 2 - PPSV23) 01/22/2020    Influenza Vaccine (1) 09/01/2021    BMI: Followup Plan  04/07/2022    Fall Risk  08/11/2022    Medicare Annual Wellness Visit (AWV)  08/11/2022    BMI: Adult  08/11/2022    Depression Screening PHQ  08/18/2022    COVID-19 Vaccine  Completed    HIB Vaccine  Aged Out    IPV Vaccine  Aged Out    Meningococcal ACWY Vaccine  Aged Out    HPV Vaccine  Aged Out       Patient Active Problem List   Diagnosis    Elevated prostate specific antigen (PSA)    Benign prostatic hyperplasia    Macrocytic    Microscopic hematuria    Liver hemangioma    Pure hypercholesterolemia    Obesity (BMI 30-39  9)    Macrocytosis    Anemia    Chronic cholecystitis with calculus    Chronic nonalcoholic liver disease    Hyperlipidemia    Osteoarthritis    Prostate cancer ([address] )    Immunization not carried out because of patient decision    Low HDL (under 40)    Over weight    Immunization due     Past Medical History:   Diagnosis Date    BPH with obstruction/lower urinary tract symptoms 2015    Elevated PSA 2012    Frequency of micturition 2015    Gallstone     Hypercholesteremia 2013    Incomplete emptying of bladder 2015    Microhematuria 2016    Nocturia 2012    Poor urinary stream 2015    Prostate cancer [facility])      Past Surgical History:   Procedure Laterality Date    CATARACT EXTRACTION Right 2010    GALLBLADDER SURGERY  12/10/2015    PROSTATE BIOPSY  2011     Family History   Problem Relation Age of Onset    Alzheimer's disease Mother     Dementia Mother     Heart attack Father     Prostate cancer Father      Social History     Socioeconomic History    Marital status: /Civil Union     Spouse name: Not on file    Number of children: Not on file    Years of education: Not on file    Highest education level: Not on file   Occupational History    Not on file   Tobacco Use    Smoking status: Never Smoker    Smokeless tobacco: Never Used   Vaping Use    Vaping Use: Never used   Substance and Sexual Activity    Alcohol use: Yes     Comment: occasionally    Drug use: No    Sexual activity: Yes   Other Topics Concern    Not on file   Social History Narrative    Consumes caffeine, 1 cup/day    Soda     Social Determinants of Health     Financial Resource Strain:     Difficulty of Paying Living Expenses:    Food Insecurity:     Worried About Running Out of Food in the Last Year:     [address] in the Last Year:    Transportation Needs:     Lack of Transportation (Medical):      Lack of Transportation (Non-Medical):    Physical Activity:     Days of Exercise per Week:     Minutes of Exercise per Session:    Stress:     Feeling of Stress :    Social Connections:     Frequency of Communication with Friends and Family:     Frequency of Social Gatherings with Friends and Family:     Attends Bahai Services:     Active Member of Clubs or Organizations:     Attends Club or Organization Meetings:     Marital Status:    Intimate Partner Violence:     Fear of Current or Ex-Partner:     Emotionally Abused:     Physically Abused:     Sexually Abused:        Current Outpatient Medications:     aspirin 81 MG tablet, Take 81 mg by mouth daily, Disp: , Rfl:     bimatoprost (LUMIGAN) 0 03 % ophthalmic drops, Administer 1 drop to the right eye daily at bedtime , Disp: , Rfl:     COMBIGAN 0 2-0 5 %, INSTILL 1 DROP INTO RIGHT EYE TWICE A DAY, Disp: , Rfl: 6    Cyanocobalamin 1000 MCG CAPS, Take 1,000 mcg by mouth daily, Disp: , Rfl:     rosuvastatin (CRESTOR) 10 MG tablet, TAKE 1 TABLET BY MOUTH EVERY DAY, Disp: 90 tablet, Rfl: 3    sildenafil (VIAGRA) 100 mg tablet, Take 1 tablet (100 mg total) by mouth daily as needed for erectile dysfunction, Disp: 30 tablet, Rfl: 1    Lumigan 0 01 % ophthalmic drops, INSTILL 1 DROP IN EACH EYE DAILY (Patient not taking: Reported on 8/18/2021), Disp: , Rfl:     Current Facility-Administered Medications:     cefTRIAXone (ROCEPHIN) injection 1,000 mg, 1,000 mg, Intravenous, Q24H, Emelyn Guerrero MD, 1,000 mg at 09/01/20 1129  No Known Allergies    Review of Systems:  Review of Systems   Constitutional: Negative for appetite change, chills, fatigue and fever  HENT: Negative  Eyes: Negative  Respiratory: Negative  Negative for shortness of breath  Cardiovascular: Negative  Negative for chest pain and leg swelling  Gastrointestinal: Negative for abdominal pain and blood in stool  Small frequent BMs   Endocrine: Positive for heat intolerance (occasional-have decreased)  Genitourinary: Negative for dysuria and hematuria  Nocturia x2-3   Musculoskeletal: Negative  Skin: Negative  Allergic/Immunologic: Negative  Negative for environmental allergies  Neurological: Negative for headaches  Hematological: Negative  Does not bruise/bleed easily  Psychiatric/Behavioral: Negative for sleep disturbance  Vitals:    08/18/21 1001   BP: 120/70   BP Location: Left arm   Patient Position: Sitting   Pulse: 70   Resp: 16   Temp: (!) 97 °F (36 1 °C)   TempSrc: Temporal   SpO2: 99%   Weight: 93 7 kg (206 lb 8 oz)   Height: 5' 10" (1 778 m)             IPSS Questionnaire (AUA-7): Over the past month    1)  How often have you had a sensation of not emptying your bladder completely after you finish urinating? 4   2)  How often have you had to urinate again less than two hours after you finished urinating? 5 - Almost always   3)  How often have you found you stopped and started again several times when you urinated? 0 - Not at all   4) How difficult have you found it to postpone urination? 3 - About half the time   5) How often have you had a weak urinary stream?  3 - About half the time   6) How often have you had to push or strain to begin urination? 0 - Not at all   7) How many times did you most typically get up to urinate from the time you went to bed until the time you got up in the morning? 2 - 2 times   Total Score:  18         Imaging:No results found

## 2021-08-18 NOTE — PROGRESS NOTES
Follow-up - Radiation Oncology   Silvestre Cohen 1940 80 y o  male [id number]      History of Present Illness   Cancer Staging  No matching staging information was found for the patient  68year old male presents today for follow up for [place] 7(3+4) prostate cancer  He completed radiation to the prostate on 12/28/2020      Here is here today for an 8 month follow up      8/20/2020- Patient received Lupron injection  Only one dose given         1/19/21-f/u Curtishelga Berman  Patient is doing well post neoadjuvant hormonal therapy  Return in six months with PSA prior           PSA Trend  1/10/18 PSA 7 8  2/5/19 PSA 5 7  3/18/20 PSA 9 0  11/2/2020 PSA 1 3  7/20/21- PSA <0 1  [facility]     Testosterone  7/20/21     97L              Upcoming  9/15/21-uro Frutoso Mighty               Oncology History                Interval History:   Hot flashes are subsiding  Historical Information   Oncology History   Prostate cancer ([facility] [address] )   6/16/2020 Biopsy    A  Prostate, r apex x5 (MRI Fusion), needle biopsy:      -Prostatic adenocarcinoma  -[place] score: 3+3 = 6  -Tumor extent: tumor involves all five submitted cores  -Tumor volume: tumor involves  10%, 60% , 60,%, 70%, 80% of each core biopsy   -Perineural invasion:  Present, few foci     B  Prostate, l lat base needle biopsy:      -Small focus of adenocarcinoma of the prostate  - [place] grade 3+3 = 6   -Tumor extent: single focus in one submitted core  -Tumor volume: tumor involves approximately 5% of biopsy volume   -Perineural invasion: not identified     C  Prostate, l lat mid needle biopsy:      -Prostatic adenocarcinoma  - [place] grade 3+3=6   -Tumor extent: single focus in one submitted core  -Tumor volume: tumor involves approximately 15% of biopsy volume   -Perineural invasion: not identified     D   Prostate, l lat apex needle biopsy:    -Benign prostatic tissue     E  Prostate, l base needle biopsy:      Prostatic adenocarcinoma  - [place] grade 3+ 4 = 7   -Tumor extent: single focus in one submitted core  -Tumor volume: tumor  involves approximately 20% of biopsy volume   -Perineural invasion: Present     F  Prostate, l mid needle biopsy:      Prostatic adenocarcinoma  - [place] grade 3+ 3=6   -Tumor extent: single focus in one submitted core  -Tumor volume: tumor discontinuously involves 50% of biopsy volume   -Perineural invasion: not identified     G  Prostate, l apex needle biopsy:    -Benign prostatic tissue     H  Prostate, r lat base needle biopsy:      Prostatic adenocarcinoma  - [place] grade 3+ 4 = 7   -Tumor extent: single focus in one submitted core  -Tumor volume: tumor involves approximately 20% of biopsy volume   -Perineural invasion: not identified     I  Prostate, r lat mid needle biopsy:    -Benign prostatic tissue     J  Prostate, r lat apex needle biopsy:    -Benign prostatic tissue     K  Prostate, r base needle biopsy:      Prostatic adenocarcinoma  - [place] grade 3+ 3=6   -Tumor extent: single focus in one submitted core  -Tumor volume: tumor discontinuously involves 50% of biopsy volume   -Perineural invasion: not identified     L  Prostate, r mid needle biopsy:    -Benign prostatic tissue     M  Prostate, r apex needle biopsy:    -Benign prostatic tissue with mild acute & chronic inflammation        8/3/2020 Initial Diagnosis    Prostate cancer ([facility] [address] )     10/22/2020 - 12/28/2020 Radiation    Treatments:  Course: C1    Plan ID Energy Fractions Dose per Fraction (cGy) Dose Correction (cGy) Total Dose Delivered (cGy) Elapsed Days   CD Prost_SV 10X 19 / 19 180 0 3,420 33   Whole Pelvis 10X 25 / 25 180 0 4,500 33      Treatment dates:  10/22/2020 - 12/28/2020           Past Medical History:   Diagnosis Date    BPH with obstruction/lower urinary tract symptoms 2015    Elevated PSA 2012    Frequency of micturition 2015    Gallstone     Hypercholesteremia 2013    Incomplete emptying of bladder 2015    Microhematuria 2016    Nocturia 2012    Poor urinary stream 2015    Prostate [facility])      Past Surgical History:   Procedure Laterality Date    CATARACT EXTRACTION Right 2010    GALLBLADDER SURGERY  12/10/2015    PROSTATE BIOPSY  2011       Social History   Social History     Substance and Sexual Activity   Alcohol Use Yes    Comment: occasionally     Social History     Substance and Sexual Activity   Drug Use No     Social History     Tobacco Use   Smoking Status Never Smoker   Smokeless Tobacco Never Used         Meds/Allergies     Current Outpatient Medications:     aspirin 81 MG tablet, Take 81 mg by mouth daily, Disp: , Rfl:     bimatoprost (LUMIGAN) 0 03 % ophthalmic drops, Administer 1 drop to the right eye daily at bedtime , Disp: , Rfl:     COMBIGAN 0 2-0 5 %, INSTILL 1 DROP INTO RIGHT EYE TWICE A DAY, Disp: , Rfl: 6    Cyanocobalamin 1000 MCG CAPS, Take 1,000 mcg by mouth daily, Disp: , Rfl:     rosuvastatin (CRESTOR) 10 MG tablet, TAKE 1 TABLET BY MOUTH EVERY DAY, Disp: 90 tablet, Rfl: 3    sildenafil (VIAGRA) 100 mg tablet, Take 1 tablet (100 mg total) by mouth daily as needed for erectile dysfunction, Disp: 30 tablet, Rfl: 1    Lumigan 0 01 % ophthalmic drops, INSTILL 1 DROP IN EACH EYE DAILY (Patient not taking: Reported on 8/18/2021), Disp: , Rfl:     Current Facility-Administered Medications:     cefTRIAXone (ROCEPHIN) injection 1,000 mg, 1,000 mg, Intravenous, Q24H, Ronak Santiago MD, 1,000 mg at 09/01/20 1129  No Known Allergies      Review of Systems   Constitutional: Negative for activity change, appetite change and fever  HENT: Negative for sneezing and sore throat  Eyes: Negative  Respiratory: Negative for cough and shortness of breath  Cardiovascular: Negative for chest pain, palpitations and leg swelling  Gastrointestinal: Negative for abdominal pain, blood in stool and rectal pain  Endocrine: Negative  Genitourinary: Positive for frequency and urgency   Negative for difficulty urinating, dysuria and hematuria  Musculoskeletal: Negative for back pain, gait problem and joint swelling  Skin: Negative  Allergic/Immunologic: Negative  Neurological: Negative for dizziness, weakness, numbness and headaches  Hematological: Negative for adenopathy  Psychiatric/Behavioral: Negative  OBJECTIVE:   /70 (BP Location: Left arm, Patient Position: Sitting)   Pulse 70   Temp (!) 97 °F (36 1 °C) (Temporal)   Resp 16   Ht 5' 10" (1 778 m)   Wt 93 7 kg (206 lb 8 oz)   SpO2 99%   BMI 29 63 kg/m²   Pain Assessment:  0  Karnofsky: 100 - Fully active, able to carry on all pre-disease performed without restriction    Physical Exam  Constitutional:       Appearance: Normal appearance  He is normal weight  HENT:      Nose: No congestion  Mouth/Throat:      Pharynx: Oropharynx is clear  Eyes:      Extraocular Movements: Extraocular movements intact  Pupils: Pupils are equal, round, and reactive to light  Cardiovascular:      Rate and Rhythm: Normal rate and regular rhythm  Heart sounds: Normal heart sounds  Pulmonary:      Effort: Pulmonary effort is normal       Breath sounds: Normal breath sounds  Abdominal:      Palpations: Abdomen is soft  There is no mass  Tenderness: There is no abdominal tenderness  Genitourinary:     Comments: Defer rectal examination due to undetectable PSA  Musculoskeletal:         General: No swelling or tenderness  Normal range of motion  Cervical back: Normal range of motion and neck supple  Skin:     General: Skin is dry  Findings: No lesion or rash  Neurological:      General: No focal deficit present  Mental Status: He is alert and oriented to person, place, and time  Mental status is at baseline  Psychiatric:         Mood and Affect: Mood normal          Behavior: Behavior normal               RESULTS    Lab Results: No results found for this or any previous visit (from the past 672 hour(s))      Imaging Studies:No results found  Assessment/Plan:  No orders of the defined types were placed in this encounter  Rangel Khan is a 80y o  year old male with   Stage II or 4 on account of possible single lymphadenopathy right perivesical area  He is doing well without any bowel complaints and some urgency and frequency urination can be related to his enlarged prostate gland but this is even better as well  He has appointment to see Dr Licha Espinal next month  We ask to see him again sometime in May of next year  Tami Garcia MD  8/18/2021,10:31 AM    Portions of the record may have been created with voice recognition software   Occasional wrong word or "sound a like" substitutions may have occurred due to the inherent limitations of voice recognition software   Read the chart carefully and recognize, using context, where substitutions have occurred

## 2021-08-31 ENCOUNTER — TELEPHONE (OUTPATIENT)
Dept: UROLOGY | Facility: MEDICAL CENTER | Age: 81
End: 2021-08-31

## 2021-08-31 NOTE — TELEPHONE ENCOUNTER
Pt's wife called to reschedule 9/15 appointment with Jeb Romero I was unable to accommodate with sooner than later with MD or AP,please review if time sensitive and contact Cora Coleman directly

## 2021-08-31 NOTE — TELEPHONE ENCOUNTER
Call placed to patient and spoke with his wife  Pt would like to reschedule appointment on 9- as they will be on vacation this week  Offered patient appointment with Dr Uzair Patrick at our [address] [place] [address] location on 9- at [facility] [address]  Wife accepted this appointment

## 2021-09-28 ENCOUNTER — OFFICE VISIT (OUTPATIENT)
Dept: UROLOGY | Age: 81
End: 2021-09-28
Payer: COMMERCIAL

## 2021-09-28 VITALS
SYSTOLIC BLOOD PRESSURE: 122 MMHG | WEIGHT: 205 LBS | BODY MASS INDEX: 29.35 KG/M2 | HEIGHT: 70 IN | DIASTOLIC BLOOD PRESSURE: 70 MMHG

## 2021-09-28 DIAGNOSIS — R35.1 NOCTURIA: ICD-10-CM

## 2021-09-28 DIAGNOSIS — C61 PROSTATE CANCER (HCC): Primary | ICD-10-CM

## 2021-09-28 PROCEDURE — 99214 OFFICE O/P EST MOD 30 MIN: CPT | Performed by: UROLOGY

## 2021-09-28 NOTE — ASSESSMENT & PLAN NOTE
He is doing well 9 months post completing radiation therapy  PSA in July was less than 0 1  Testosterone level remains low  He is voiding adequately  We will plan to repeat a PSA and  testosterone level in January  He will return in 6 months

## 2021-09-28 NOTE — ASSESSMENT & PLAN NOTE
Nocturia has improved recently  We will continue to follow and consider further evaluation or therapy should he not be satisfied his voiding pattern

## 2021-09-28 NOTE — PATIENT INSTRUCTIONS
Prostate Cancer, Ambulatory Care   GENERAL INFORMATION:   Prostate cancer, Ambulatory Care develops in the male sex gland that helps make semen (prostate)  It is about the size of a walnut and wraps around the urethra  The urethra is the tube that carries urine from the bladder to the end of the penis  In most cases, prostate cancer is slow growing  Common symptoms include the following:   · Trouble starting or stopping the flow of urine    · Feeling the need to urinate often, especially at night    · Pain or a burning feeling when you urinate or ejaculate semen    · Trouble having an erection    · Blood in your urine or semen    · Not being able to urinate at all    · Pain or stiffness in your lower back, hips, or upper thighs  Seek immediate care for the following symptoms:   · Chest pain when you take a deep breath or cough    · Coughing up blood    · Suddenly feeling lightheaded and short of breath    · Your leg feels warm, tender, and painful  It may look swollen and red  Treatment for prostate cancer: If you have early stage cancer, your healthcare provider may recommend that you have frequent tests and regular follow-up visits to watch for changes  You may also need any of the following:  · Hormone therapy  is medicine used to decrease testosterone (male hormone) levels  · Radiation therapy  is used to kill cancer cells with high-energy x-rays beams  You may receive radiation therapy from outside your body or from small beads or rods placed inside your prostate  · Surgery  may be needed, depending on the stage of the cancer  Part or all of your prostate may be removed  You may also need to have some lymph nodes taken out  This may help keep the cancer from spreading to other parts of your body  Manage your prostate cancer:   · Eat a variety of healthy foods  Healthy foods include fruits, vegetables, whole-grain breads, low-fat dairy products, beans, lean meats, and fish   Your healthcare provider may also recommend changes to the amounts of calcium and vitamin D you have each day  · Manage your weight  Obesity may increase your risk for problems from prostate cancer  Limit or do not have high-calorie foods or drinks  · Exercise as directed  Exercise may help you recover after treatment and may help prevent your prostate cancer from returning  Exercise can also help you manage your weight  Try to get at least 30 minutes of exercise 5 days a week, such as walking  · Do not smoke  If you smoke, it is never too late to quit  Smoking increases the risk that your prostate cancer will return after treatment  Smoking also increases your risk for other types of cancer  Ask your healthcare provider for information if you need help quitting  · Limit or do not drink alcohol  If you drink, limit alcohol to 2 drinks per day  A drink is 12 ounces of beer, 1½ ounces of liquor, or 5 ounces of wine  Follow up with your urologist or oncologist as directed:  Write down your questions so you remember to ask them during your visits  CARE AGREEMENT:   You have the right to help plan your care  Learn about your health condition and how it may be treated  Discuss treatment options with your caregivers to decide what care you want to receive  You always have the right to refuse treatment  The above information is an  only  It is not intended as medical advice for individual conditions or treatments  Talk to your doctor, nurse or pharmacist before following any medical regimen to see if it is safe and effective for you  © 2014 [address] is for End User's use only and may not be sold, redistributed or otherwise used for commercial purposes  All illustrations and images included in CareNotes® are the copyrighted property of A D A M , Inc  or Timothy Frausto

## 2021-09-28 NOTE — PROGRESS NOTES
Assessment/Plan:    Prostate cancer [facility])  He is doing well 9 months post completing radiation therapy  PSA in July was less than 0 1  Testosterone level remains low  He is voiding adequately  We will plan to repeat a PSA and  testosterone level in January  He will return in 6 months  Nocturia  Nocturia has improved recently  We will continue to follow and consider further evaluation or therapy should he not be satisfied his voiding pattern  Diagnoses and all orders for this visit:    Prostate cancer ([facility] [address] )  -     PSA Total, Diagnostic; Future  -     Testosterone; Future    Nocturia          Subjective:      Patient ID: Jean Marie Gama is a 80 y o  male  Chief complaint:  Prostate cancer      HPI:  32-year-old male recently completed radiation therapy on December 28, 2020  for clinically localized prostate cancer  He received 1 dose of neoadjuvant Lupron in August   He notes that he tolerated radiation therapy well  Hot flashes have resolved  He is voiding adequately and notes that his voiding pattern continues to improve as he resolves the cute effects of the radiation therapy  He has had episodes of nocturia but recently this is better  There is no gross hematuria, dysuria or symptoms of infection  He has no incontinence or rectal bleeding  There is no new back or bone pain  The following portions of the patient's history were reviewed and updated as appropriate: allergies, current medications, past family history, past medical history, past social history, past surgical history and problem list     Review of Systems   Constitutional: Negative for chills, diaphoresis, fatigue, fever and unexpected weight change  HENT: Negative  Eyes: Negative  Respiratory: Negative  Cardiovascular: Negative  Gastrointestinal: Negative for anal bleeding, blood in stool, constipation, diarrhea and rectal pain     Endocrine:        Hot flashes from lupron   Genitourinary:        See HPI Musculoskeletal: Negative  Skin: Negative  Allergic/Immunologic: Negative  Neurological: Negative  Hematological: Negative  Psychiatric/Behavioral: Negative  AUA SYMPTOM SCORE      Most Recent Value   AUA SYMPTOM SCORE   How often have you had a sensation of not emptying your bladder completely after you finished urinating? 3   How often have you had to urinate again less than two hours after you finished urinating? 3   How often have you found you stopped and started again several times when you urinate? 3   How often have you found it difficult to postpone urination? 3   How often have you had a weak urinary stream?  2   How often have you had to push or strain to begin urination? 3   How many times did you most typically get up to urinate from the time you went to bed at night until the time you got up in the morning? 3   Quality of Life: If you were to spend the rest of your life with your urinary condition just the way it is now, how would you feel about that?  3   AUA SYMPTOM SCORE  20          Objective:      /70   Ht 5' 10" (1 778 m)   Wt 93 kg (205 lb)   BMI 29 41 kg/m²          Physical Exam  Vitals reviewed  Constitutional:       General: He is not in acute distress  Appearance: Normal appearance  He is well-developed  He is not ill-appearing, toxic-appearing or diaphoretic  HENT:      Head: Normocephalic and atraumatic  Eyes:      General: No scleral icterus  Conjunctiva/sclera: Conjunctivae normal    Cardiovascular:      Rate and Rhythm: Normal rate  Pulmonary:      Effort: Pulmonary effort is normal    Abdominal:      General: Bowel sounds are normal  There is no distension  Palpations: Abdomen is soft  There is no mass  Tenderness: There is no abdominal tenderness  There is no right CVA tenderness, left CVA tenderness, guarding or rebound  Hernia: A hernia is present        Comments: Right inguinal hernia  Bladder decompressed Genitourinary:     Penis: Normal  No phimosis or hypospadias  Testes: Normal          Right: Mass not present  Left: Mass not present  Rectum: Normal       Comments: Prostate mildly enlarged, flattening and palpably benign  Musculoskeletal:         General: Normal range of motion  Cervical back: Neck supple  Skin:     General: Skin is warm and dry  Neurological:      General: No focal deficit present  Mental Status: He is alert and oriented to person, place, and time  Psychiatric:         Mood and Affect: Mood normal          Behavior: Behavior normal          Thought Content:  Thought content normal          Judgment: Judgment normal

## 2021-12-14 ENCOUNTER — OFFICE VISIT (OUTPATIENT)
Dept: FAMILY MEDICINE CLINIC | Facility: CLINIC | Age: 81
End: 2021-12-14
Payer: COMMERCIAL

## 2021-12-14 VITALS
RESPIRATION RATE: 18 BRPM | BODY MASS INDEX: 30.15 KG/M2 | DIASTOLIC BLOOD PRESSURE: 70 MMHG | TEMPERATURE: 96.8 F | HEIGHT: 70 IN | WEIGHT: 210.6 LBS | HEART RATE: 65 BPM | OXYGEN SATURATION: 95 % | SYSTOLIC BLOOD PRESSURE: 124 MMHG

## 2021-12-14 DIAGNOSIS — E78.6 LOW HDL (UNDER 40): ICD-10-CM

## 2021-12-14 DIAGNOSIS — D75.89 MACROCYTOSIS: ICD-10-CM

## 2021-12-14 DIAGNOSIS — R63.5 WEIGHT GAIN: ICD-10-CM

## 2021-12-14 DIAGNOSIS — D18.03 LIVER HEMANGIOMA: ICD-10-CM

## 2021-12-14 DIAGNOSIS — E78.2 MIXED HYPERLIPIDEMIA: Primary | ICD-10-CM

## 2021-12-14 DIAGNOSIS — C61 PROSTATE CANCER (HCC): ICD-10-CM

## 2021-12-14 DIAGNOSIS — Z28.20 IMMUNIZATION NOT CARRIED OUT BECAUSE OF PATIENT DECISION: ICD-10-CM

## 2021-12-14 PROCEDURE — 1160F RVW MEDS BY RX/DR IN RCRD: CPT | Performed by: FAMILY MEDICINE

## 2021-12-14 PROCEDURE — 99214 OFFICE O/P EST MOD 30 MIN: CPT | Performed by: FAMILY MEDICINE

## 2021-12-15 PROBLEM — R63.5 WEIGHT GAIN: Status: ACTIVE | Noted: 2021-12-15

## 2021-12-20 ENCOUNTER — TELEPHONE (OUTPATIENT)
Dept: OTHER | Facility: OTHER | Age: 81
End: 2021-12-20

## 2021-12-20 DIAGNOSIS — R31.0 GROSS HEMATURIA: Primary | ICD-10-CM

## 2021-12-22 LAB
APPEARANCE UR: CLEAR
BACTERIA UR QL AUTO: NORMAL /HPF
BILIRUB UR QL STRIP: NEGATIVE
BUN SERPL-MCNC: 15 MG/DL (ref 7–25)
BUN/CREAT SERPL: NORMAL (CALC) (ref 6–22)
CALCIUM SERPL-MCNC: 9.5 MG/DL (ref 8.6–10.3)
CHLORIDE SERPL-SCNC: 104 MMOL/L (ref 98–110)
CO2 SERPL-SCNC: 29 MMOL/L (ref 20–32)
COLOR UR: YELLOW
CREAT SERPL-MCNC: 1.04 MG/DL (ref 0.7–1.18)
GLUCOSE SERPL-MCNC: 88 MG/DL (ref 65–99)
GLUCOSE UR QL STRIP: NEGATIVE
HGB UR QL STRIP: NEGATIVE
HYALINE CASTS #/AREA URNS LPF: NORMAL /LPF
KETONES UR QL STRIP: NEGATIVE
LEUKOCYTE ESTERASE UR QL STRIP: NEGATIVE
NITRITE UR QL STRIP: NEGATIVE
PH UR STRIP: 5.5 [PH] (ref 5–8)
POTASSIUM SERPL-SCNC: 4.3 MMOL/L (ref 3.5–5.3)
PROT UR QL STRIP: NEGATIVE
RBC #/AREA URNS HPF: NORMAL /HPF
SL AMB EGFR AFRICAN AMERICAN: 79 ML/MIN/1.73M2
SL AMB EGFR NON AFRICAN AMERICAN: 68 ML/MIN/1.73M2
SODIUM SERPL-SCNC: 141 MMOL/L (ref 135–146)
SP GR UR STRIP: 1.01 (ref 1–1.03)
SQUAMOUS #/AREA URNS HPF: NORMAL /HPF
WBC #/AREA URNS HPF: NORMAL /HPF

## 2021-12-27 ENCOUNTER — HOSPITAL ENCOUNTER (OUTPATIENT)
Dept: CT IMAGING | Facility: HOSPITAL | Age: 81
Discharge: HOME | End: 2021-12-27
Payer: COMMERCIAL

## 2021-12-27 DIAGNOSIS — R31.0 GROSS HEMATURIA: ICD-10-CM

## 2021-12-27 LAB
PATH REPORT.COMMENTS IMP SPEC: NORMAL
PATH REPORT.FINAL DX SPEC: NORMAL
SPECIMEN SOURCE: NORMAL

## 2021-12-27 PROCEDURE — G1004 CDSM NDSC: HCPCS

## 2021-12-27 PROCEDURE — 74178 CT ABD&PLV WO CNTR FLWD CNTR: CPT

## 2021-12-27 RX ADMIN — IOHEXOL 85 ML: 350 INJECTION, SOLUTION INTRAVENOUS at 14:02

## 2022-01-24 ENCOUNTER — OFFICE VISIT (OUTPATIENT)
Dept: UROLOGY | Facility: MEDICAL CENTER | Age: 82
End: 2022-01-24
Payer: COMMERCIAL

## 2022-01-24 VITALS
DIASTOLIC BLOOD PRESSURE: 74 MMHG | SYSTOLIC BLOOD PRESSURE: 140 MMHG | BODY MASS INDEX: 30.06 KG/M2 | HEART RATE: 73 BPM | HEIGHT: 70 IN | WEIGHT: 210 LBS

## 2022-01-24 DIAGNOSIS — C61 PROSTATE CANCER (HCC): ICD-10-CM

## 2022-01-24 DIAGNOSIS — R97.20 ELEVATED PSA: ICD-10-CM

## 2022-01-24 DIAGNOSIS — R31.29 MICROSCOPIC HEMATURIA: Primary | ICD-10-CM

## 2022-01-24 LAB
SL AMB  POCT GLUCOSE, UA: ABNORMAL
SL AMB LEUKOCYTE ESTERASE,UA: ABNORMAL
SL AMB POCT BILIRUBIN,UA: ABNORMAL
SL AMB POCT BLOOD,UA: ABNORMAL
SL AMB POCT CLARITY,UA: CLEAR
SL AMB POCT COLOR,UA: YELLOW
SL AMB POCT KETONES,UA: ABNORMAL
SL AMB POCT NITRITE,UA: ABNORMAL
SL AMB POCT PH,UA: 5.5
SL AMB POCT SPECIFIC GRAVITY,UA: 1.02
SL AMB POCT URINE PROTEIN: ABNORMAL
SL AMB POCT UROBILINOGEN: 0.2

## 2022-01-24 PROCEDURE — 87086 URINE CULTURE/COLONY COUNT: CPT | Performed by: PHYSICIAN ASSISTANT

## 2022-01-24 PROCEDURE — 81001 URINALYSIS AUTO W/SCOPE: CPT | Performed by: PHYSICIAN ASSISTANT

## 2022-01-24 PROCEDURE — 81003 URINALYSIS AUTO W/O SCOPE: CPT | Performed by: PHYSICIAN ASSISTANT

## 2022-01-24 PROCEDURE — 99214 OFFICE O/P EST MOD 30 MIN: CPT | Performed by: PHYSICIAN ASSISTANT

## 2022-01-24 PROCEDURE — 1160F RVW MEDS BY RX/DR IN RCRD: CPT | Performed by: PHYSICIAN ASSISTANT

## 2022-01-24 NOTE — PROGRESS NOTES
1/24/2022      Chief Complaint   Patient presents with    Prostate Cancer         Assessment and Plan    80 y o  male managed by Dr Jd Morales     1  Prostate cancer  · Felipe 3+3=6 in 5/12 cores and Lehr 3+4=7 in 2/12 cores  · MRI with PIRADs 4 on 4/03/2020  · S/p radiation completed 12/28/2020  · S/p 1 dose of Lurpon 8/20/2020  · PSA: 0 05 (1/17/22)   · Previous PSA: <0 1 (7/20/21), 1 3 (11/2/2020), 9 0 (3/18/2020)   · Testosterone: 200 (1/17/22)   · Previous: 97 (7/20/21)   · Follow up in 6 months with PSA  2  Radiation cystitis  · Urine today: trace blood   · Episode in December 2021  · CT renal protocol benign  · No subsequent issues  History of Present Illness  Edison Marx is a 80 y o  male here for evaluation of prostate cancer  Patient MRI of the prostate on 04/03/2020 for evaluation of elevated PSA  MRI revealed PI-RADS 4  Prostate biopsy on 06/16/2020 revealed Lehr 3+3=6 in 5/12 cores and Felipe 3+4=7 in 2/12 cores  Patient is s/p 1 dose of neoadjuvant Lupron on 08/20/2020 in completed radiation therapy on 12/28/2020  His PSA remains low and stable, please see trend above  Patient did have 1 episode of radiation cystitis in December 2021 that lasted for 24 hours  CT renal protocol was ordered which revealed prostatic changes compatible with radiation therapy without evidence of metastatic disease  Patient denies any subsequent episodes of gross hematuria  He denies any difficulty voiding, dysuria, or incontinence  He denies any fatigue or muscle weakness  He denies any fevers or chills  He is agreeable plan above  Review of Systems   Constitutional: Negative for chills and fever  HENT: Negative  Respiratory: Negative  Cardiovascular: Negative  Gastrointestinal: Negative for abdominal pain, nausea and vomiting  Genitourinary: Negative for difficulty urinating, dysuria, flank pain, frequency, hematuria (Denies any subsequent episodes) and urgency  Musculoskeletal: Negative  Skin: Negative  Neurological: Negative  AUA SYMPTOM SCORE      Most Recent Value   AUA SYMPTOM SCORE    How often have you had a sensation of not emptying your bladder completely after you finished urinating? 2   How often have you had to urinate again less than two hours after you finished urinating? 2   How often have you found you stopped and started again several times when you urinate? 2   How often have you found it difficult to postpone urination? 1   How often have you had a weak urinary stream? 2   How often have you had to push or strain to begin urination? 1   How many times did you most typically get up to urinate from the time you went to bed at night until the time you got up in the morning? 2   Quality of Life: If you were to spend the rest of your life with your urinary condition just the way it is now, how would you feel about that? 1   AUA SYMPTOM SCORE 12           Vitals  Vitals:    01/24/22 1113   BP: 140/74   Pulse: 73   Weight: 95 3 kg (210 lb)   Height: 5' 10" (1 778 m)       Physical Exam  Vitals reviewed  Constitutional:       General: He is not in acute distress  Appearance: Normal appearance  He is not ill-appearing, toxic-appearing or diaphoretic  HENT:      Head: Normocephalic and atraumatic  Eyes:      Conjunctiva/sclera: Conjunctivae normal    Pulmonary:      Effort: Pulmonary effort is normal  No respiratory distress  Abdominal:      General: There is no distension  Palpations: Abdomen is soft  Tenderness: There is no abdominal tenderness  There is no right CVA tenderness, left CVA tenderness, guarding or rebound  Musculoskeletal:         General: Normal range of motion  Cervical back: Normal range of motion  Skin:     General: Skin is warm and dry  Neurological:      General: No focal deficit present  Mental Status: He is alert and oriented to person, place, and time     Psychiatric:         Mood and Affect: Mood normal          Behavior: Behavior normal          Thought Content:  Thought content normal          Judgment: Judgment normal        Past History  Past Medical History:   Diagnosis Date    BPH with obstruction/lower urinary tract symptoms 2015    Elevated PSA 2012    Frequency of micturition 2015    Gallstone     Hypercholesteremia 2013    Incomplete emptying of bladder 2015    Microhematuria 2016    Nocturia 2012    Poor urinary stream 2015    Prostate cancer ([facility] [address] )      Social History     Socioeconomic History    Marital status: /Civil Union     Spouse name: None    Number of children: None    Years of education: None    Highest education level: None   Occupational History    None   Tobacco Use    Smoking status: Never Smoker    Smokeless tobacco: Never Used   Vaping Use    Vaping Use: Never used   Substance and Sexual Activity    Alcohol use: Yes     Comment: occasionally    Drug use: No    Sexual activity: Yes   Other Topics Concern    None   Social History Narrative    Consumes caffeine, 1 cup/day    Soda     Social Determinants of Health     Financial Resource Strain: Not on file   Food Insecurity: Not on file   Transportation Needs: Not on file   Physical Activity: Not on file   Stress: Not on file   Social Connections: Not on file   Intimate Partner Violence: Not on file   Housing Stability: Not on file     Social History     Tobacco Use   Smoking Status Never Smoker   Smokeless Tobacco Never Used     Family History   Problem Relation Age of Onset    Alzheimer's disease Mother     Dementia Mother     Heart attack Father     Prostate cancer Father        The following portions of the patient's history were reviewed and updated as appropriate: allergies, current medications, past medical history, past social history, past surgical history and problem list     Results  Recent Results (from the past 1 hour(s))   POCT urine dip auto non-scope    Collection Time: 01/24/22 11:22 AM   Result Value Ref Range     COLOR,UA yellow     CLARITY,UA clear     SPECIFIC GRAVITY,UA 1 025      PH,UA 5 5     LEUKOCYTE ESTERASE,UA neg     NITRITE,UA neg     GLUCOSE, UA neg     KETONES,UA neg     BILIRUBIN,UA neg     BLOOD,UA trace-intact     POCT URINE PROTEIN trace     SL AMB POCT UROBILINOGEN 0 2    ]  Lab Results   Component Value Date    PSA 1 3 11/02/2020     Lab Results   Component Value Date    CALCIUM 9 5 12/21/2021    K 4 3 12/21/2021    CO2 29 12/21/2021     12/21/2021    BUN 15 12/21/2021    CREATININE 1 04 12/21/2021     Lab Results   Component Value Date    WBC 4 3 01/13/2021    HGB 13 7 01/13/2021    HCT 41 1 01/13/2021    MCV 99 3 01/13/2021     01/13/2021     Gagan Martinez PA-C

## 2022-01-25 LAB
BACTERIA UR CULT: NORMAL
BACTERIA UR QL AUTO: ABNORMAL /HPF
BILIRUB UR QL STRIP: NEGATIVE
CLARITY UR: CLEAR
COLOR UR: ABNORMAL
GLUCOSE UR STRIP-MCNC: NEGATIVE MG/DL
HGB UR QL STRIP.AUTO: NEGATIVE
KETONES UR STRIP-MCNC: NEGATIVE MG/DL
LEUKOCYTE ESTERASE UR QL STRIP: ABNORMAL
NITRITE UR QL STRIP: NEGATIVE
NON-SQ EPI CELLS URNS QL MICRO: ABNORMAL /HPF
PH UR STRIP.AUTO: 6 [PH]
PROT UR STRIP-MCNC: NEGATIVE MG/DL
RBC #/AREA URNS AUTO: ABNORMAL /HPF
SP GR UR STRIP.AUTO: 1.01 (ref 1–1.03)
UROBILINOGEN UR QL STRIP.AUTO: 0.2 E.U./DL
WBC #/AREA URNS AUTO: ABNORMAL /HPF

## 2022-02-10 ENCOUNTER — TELEPHONE (OUTPATIENT)
Dept: FAMILY MEDICINE CLINIC | Facility: CLINIC | Age: 82
End: 2022-02-10

## 2022-02-10 NOTE — TELEPHONE ENCOUNTER
Patient will need an order for a Covid antigen test   He is traveling and this is required  Can we mail this to him asap?

## 2022-02-10 NOTE — TELEPHONE ENCOUNTER
After talking to the patient, he actually needs the rapid test   So, he will have to go to the pharmacy for this  Patient is aware

## 2022-04-05 ENCOUNTER — RA CDI HCC (OUTPATIENT)
Dept: OTHER | Facility: HOSPITAL | Age: 82
End: 2022-04-05

## 2022-04-05 NOTE — PROGRESS NOTES
[facility]  coding opportunities       Chart reviewed, no opportunity found: CHART REVIEWED, NO OPPORTUNITY FOUND        Patients Insurance     Medicare Insurance: [employer] [facility] Ad[employer]

## 2022-04-12 ENCOUNTER — OFFICE VISIT (OUTPATIENT)
Dept: FAMILY MEDICINE CLINIC | Facility: CLINIC | Age: 82
End: 2022-04-12
Payer: COMMERCIAL

## 2022-04-12 VITALS
HEART RATE: 67 BPM | OXYGEN SATURATION: 98 % | SYSTOLIC BLOOD PRESSURE: 110 MMHG | HEIGHT: 70 IN | WEIGHT: 210 LBS | BODY MASS INDEX: 30.06 KG/M2 | DIASTOLIC BLOOD PRESSURE: 64 MMHG | TEMPERATURE: 97.5 F

## 2022-04-12 DIAGNOSIS — D18.03 LIVER HEMANGIOMA: ICD-10-CM

## 2022-04-12 DIAGNOSIS — D75.89 MACROCYTOSIS: ICD-10-CM

## 2022-04-12 DIAGNOSIS — E78.6 LOW HDL (UNDER 40): ICD-10-CM

## 2022-04-12 DIAGNOSIS — E66.9 OBESITY (BMI 30.0-34.9): ICD-10-CM

## 2022-04-12 DIAGNOSIS — Z71.85 IMMUNIZATION COUNSELING: ICD-10-CM

## 2022-04-12 DIAGNOSIS — C61 PROSTATE CANCER (HCC): ICD-10-CM

## 2022-04-12 DIAGNOSIS — R73.9 ELEVATED BLOOD SUGAR: ICD-10-CM

## 2022-04-12 DIAGNOSIS — E78.2 MIXED HYPERLIPIDEMIA: Primary | ICD-10-CM

## 2022-04-12 DIAGNOSIS — R79.89 ABNORMAL LFTS (LIVER FUNCTION TESTS): ICD-10-CM

## 2022-04-12 PROCEDURE — 1160F RVW MEDS BY RX/DR IN RCRD: CPT | Performed by: FAMILY MEDICINE

## 2022-04-12 PROCEDURE — 99214 OFFICE O/P EST MOD 30 MIN: CPT | Performed by: FAMILY MEDICINE

## 2022-04-12 NOTE — PROGRESS NOTES
Assessment/Plan:       No problem-specific Assessment & Plan notes found for this encounter  Diagnoses and all orders for this visit:    Mixed hyperlipidemia  Comments:  good control  follow with low-fat diet    Liver hemangioma  Comments:  stable    Low HDL (under 40)  Comments:  walk 20 minutes daily    Prostate cancer ([facility] [address] )  Comments: Following with urology    Macrocytosis  Comments:  corrected,    Elevated blood sugar  Comments: To follow with low sweet and carbohydrate diet  Orders:  -     Hemoglobin A1C; Future    Obesity (BMI 30 0-34  9)  Comments:  Advised to lose weight    Abnormal LFTs (liver function tests)  Comments:  corrected    Immunization counseling  Comments:  recommend Covid vaccine booster        Patient Instructions   To follow with  Test results      Orders Placed This Encounter   Procedures    Hemoglobin A1C     Standing Status:   Future     Standing Expiration Date:   4/12/2023         Subjective:     Patient ID: Vick Hurt is a [de-identified] y o  male      HPI   Patient is here for follow-up on his chronic medical problem  Gross hematuria,  He developed  hematuria x1 time ,  December last year, he did follow with his urologist , he stop aspirin  No recurrence     test results   Lab done January 17, 2022   renal CT scan December 27, 2021 noted  Review of Systems   Constitutional: Negative for activity change, appetite change, fatigue and fever  HENT: Negative for ear pain, tinnitus, trouble swallowing and voice change  Eyes: Negative for photophobia, pain and visual disturbance  Respiratory: Negative for cough, chest tightness and wheezing  Cardiovascular: Negative for chest pain, palpitations and leg swelling  Gastrointestinal: Negative for abdominal distention, abdominal pain, anal bleeding, constipation, diarrhea, nausea and rectal pain  Endocrine: Negative for cold intolerance, heat intolerance, polydipsia and polyuria     Genitourinary: Negative for decreased urine volume, difficulty urinating, dysuria, flank pain, frequency, hematuria, penile pain, penile swelling and urgency  Musculoskeletal: Negative for arthralgias, back pain, gait problem, myalgias and neck pain  Skin: Negative for pallor and rash  Allergic/Immunologic: Negative for immunocompromised state  Neurological: Negative for dizziness, seizures, syncope, facial asymmetry, speech difficulty, light-headedness and headaches  Hematological: Negative for adenopathy  Does not bruise/bleed easily  Psychiatric/Behavioral: Negative for agitation, confusion and hallucinations  The patient is not nervous/anxious  Objective:     Physical Exam  Constitutional:       General: He is not in acute distress  Appearance: Normal appearance  He is well-developed  He is not ill-appearing or diaphoretic  HENT:      Head: Normocephalic  Eyes:      General: No scleral icterus  Right eye: No discharge  Left eye: No discharge  Extraocular Movements: Extraocular movements intact  Pupils: Pupils are equal, round, and reactive to light  Neck:      Thyroid: No thyromegaly  Vascular: No carotid bruit or JVD  Cardiovascular:      Rate and Rhythm: Normal rate and regular rhythm  Heart sounds: Normal heart sounds  No murmur heard  No gallop  Pulmonary:      Effort: Pulmonary effort is normal       Breath sounds: Normal breath sounds  Abdominal:      General: Bowel sounds are normal  There is no distension  Palpations: Abdomen is soft  There is no mass  Tenderness: There is no abdominal tenderness  There is no guarding or rebound  Musculoskeletal:         General: No swelling or tenderness  Normal range of motion  Cervical back: Neck supple  Right lower leg: No edema  Left lower leg: No edema  Lymphadenopathy:      Cervical: No cervical adenopathy  Skin:     Coloration: Skin is not jaundiced or pale  Findings: No rash     Neurological: General: No focal deficit present  Mental Status: He is alert and oriented to person, place, and time  Cranial Nerves: No cranial nerve deficit  Sensory: No sensory deficit  Motor: No weakness or abnormal muscle tone  Coordination: Coordination normal       Gait: Gait normal    Psychiatric:         Mood and Affect: Mood normal          Behavior: Behavior normal          Thought Content:  Thought content normal          Judgment: Judgment normal

## 2022-05-04 ENCOUNTER — RADIATION ONCOLOGY FOLLOW-UP (OUTPATIENT)
Dept: RADIATION ONCOLOGY | Facility: CLINIC | Age: 82
End: 2022-05-04
Attending: RADIOLOGY
Payer: COMMERCIAL

## 2022-05-04 VITALS
BODY MASS INDEX: 30.02 KG/M2 | WEIGHT: 209.66 LBS | RESPIRATION RATE: 17 BRPM | DIASTOLIC BLOOD PRESSURE: 75 MMHG | OXYGEN SATURATION: 98 % | HEIGHT: 70 IN | TEMPERATURE: 97.3 F | HEART RATE: 70 BPM | SYSTOLIC BLOOD PRESSURE: 126 MMHG

## 2022-05-04 DIAGNOSIS — C61 PROSTATE CANCER (HCC): Primary | ICD-10-CM

## 2022-05-04 PROCEDURE — G0463 HOSPITAL OUTPT CLINIC VISIT: HCPCS | Performed by: RADIOLOGY

## 2022-05-04 PROCEDURE — 99213 OFFICE O/P EST LOW 20 MIN: CPT | Performed by: RADIOLOGY

## 2022-05-04 PROCEDURE — 99211 OFF/OP EST MAY X REQ PHY/QHP: CPT | Performed by: RADIOLOGY

## 2022-05-04 NOTE — PROGRESS NOTES
Jules Soto 1940 is a [de-identified] y o  male  presents today for follow up for [place] 7(3+4) prostate cancer  He completed radiation to the prostate on 2020  He was last seen 2021  Today's visit is a 6 month follow-up    22  Testosterone     200                 PSA                  0 05    22  Urology  PSA remains low and stable  F/u in 6 months with PSA    Upcomin22  Urology        Follow up visit     Oncology History   Prostate cancer ([facility] [address] )   2020 Biopsy    A  Prostate, r apex x5 (MRI Fusion), needle biopsy:      -Prostatic adenocarcinoma  -[place] score: 3+3 = 6  -Tumor extent: tumor involves all five submitted cores  -Tumor volume: tumor involves  10%, 60% , 60,%, 70%, 80% of each core biopsy   -Perineural invasion:  Present, few foci     B  Prostate, l lat base needle biopsy:      -Small focus of adenocarcinoma of the prostate  - Felipe grade 3+3 = 6   -Tumor extent: single focus in one submitted core  -Tumor volume: tumor involves approximately 5% of biopsy volume   -Perineural invasion: not identified     C  Prostate, l lat mid needle biopsy:      -Prostatic adenocarcinoma  - [place] grade 3+3=6   -Tumor extent: single focus in one submitted core  -Tumor volume: tumor involves approximately 15% of biopsy volume   -Perineural invasion: not identified     D  Prostate, l lat apex needle biopsy:    -Benign prostatic tissue     E  Prostate, l base needle biopsy:      Prostatic adenocarcinoma  - Felipe grade 3+ 4 = 7   -Tumor extent: single focus in one submitted core  -Tumor volume: tumor  involves approximately 20% of biopsy volume   -Perineural invasion: Present     F  Prostate, l mid needle biopsy:      Prostatic adenocarcinoma  - Felipe grade 3+ 3=6   -Tumor extent: single focus in one submitted core  -Tumor volume: tumor discontinuously involves 50% of biopsy volume   -Perineural invasion: not identified     G   Prostate, l apex needle biopsy:    -Benign prostatic tissue H  Prostate, r lat base needle biopsy:      Prostatic adenocarcinoma  - [place] grade 3+ 4 = 7   -Tumor extent: single focus in one submitted core  -Tumor volume: tumor involves approximately 20% of biopsy volume   -Perineural invasion: not identified     I  Prostate, r lat mid needle biopsy:    -Benign prostatic tissue     J  Prostate, r lat apex needle biopsy:    -Benign prostatic tissue     K  Prostate, r base needle biopsy:      Prostatic adenocarcinoma  - Felipe grade 3+ 3=6   -Tumor extent: single focus in one submitted core  -Tumor volume: tumor discontinuously involves 50% of biopsy volume   -Perineural invasion: not identified     L  Prostate, r mid needle biopsy:    -Benign prostatic tissue     M  Prostate, r apex needle biopsy:    -Benign prostatic tissue with mild acute & chronic inflammation        8/3/2020 Initial Diagnosis    Prostate cancer ([facility] [address] )     10/22/2020 - 12/28/2020 Radiation    Treatments:  Course: C1    Plan ID Energy Fractions Dose per Fraction (cGy) Dose Correction (cGy) Total Dose Delivered (cGy) Elapsed Days   CD Prost_SV 10X 19 / 19 180 0 3,420 33   Whole Pelvis 10X 25 / 25 180 0 4,500 33      Treatment dates:  10/22/2020 - 12/28/2020  Review of Systems:  Review of Systems   Constitutional: Negative  HENT: Negative  Eyes: Negative  Respiratory: Negative  Cardiovascular: Negative  Gastrointestinal: Negative  Endocrine: Negative  Genitourinary: Negative  Musculoskeletal: Positive for back pain  Skin: Negative  Allergic/Immunologic: Negative  Neurological: Negative  Hematological: Negative  Psychiatric/Behavioral: Negative  Clinical Trial: no    IPSS Questionnaire (AUA-7): Over the past month    1)  How often have you had a sensation of not emptying your bladder completely after you finish urinating? 1 - Less than 1 time in 5   2)  How often have you had to urinate again less than two hours after you finished urinating?  1 - Less than 1 time in 5   3)  How often have you found you stopped and started again several times when you urinated? 0 - Not at all   4) How difficult have you found it to postpone urination? 3 - About half the time   5) How often have you had a weak urinary stream?  2 - Less than half the time   6) How often have you had to push or strain to begin urination? 0 - Not at all   7) How many times did you most typically get up to urinate from the time you went to bed until the time you got up in the morning? 2 - 2 times   Total Score:  9       Teaching     Covid Vaccine Status vaccinated     Health Maintenance   Topic Date Due    Hepatitis A Vaccine (1 of 2 - Risk 2-dose series) Never done    Hepatitis B Vaccine (1 of 3 - Risk 3-dose series) Never done    Depression Screening  08/18/2022    Pneumococcal Vaccine: 65+ Years (1 of 2 - PPSV23) 12/14/2022 (Originally 1/22/2020)    DTaP,Tdap,and Td Vaccines (1 - Tdap) 12/14/2022 (Originally 2/17/1961)    Fall Risk  08/11/2022    Medicare Annual Wellness Visit (AWV)  08/11/2022    BMI: Followup Plan  12/14/2022    BMI: Adult  04/12/2023    Influenza Vaccine  Completed    COVID-19 Vaccine  Completed    HIB Vaccine  Aged Out    IPV Vaccine  Aged Out    Meningococcal ACWY Vaccine  Aged Out    HPV Vaccine  Aged Out     Patient Active Problem List   Diagnosis    Elevated prostate specific antigen (PSA)    Benign prostatic hyperplasia    Macrocytic    Microscopic hematuria    Liver hemangioma    Pure hypercholesterolemia    Obesity (BMI 30-39  9)    Macrocytosis    Anemia    Chronic cholecystitis with calculus    Chronic nonalcoholic liver disease    Hyperlipidemia    Osteoarthritis    Prostate cancer ([facility] [address] )    Immunization not carried out because of patient decision    Low HDL (under 40)    Over weight    Immunization due    Nocturia    Weight gain     Past Medical History:   Diagnosis Date    BPH with obstruction/lower urinary tract symptoms 2015  Elevated PSA 2012    Frequency of micturition 2015    Gallstone     Hypercholesteremia 2013    Incomplete emptying of bladder 2015    Microhematuria 2016    Nocturia 2012    Poor urinary stream 2015    Prostate cancer [facility])      Past Surgical History:   Procedure Laterality Date    CATARACT EXTRACTION Right 2010    GALLBLADDER SURGERY  12/10/2015    PROSTATE BIOPSY  2011     Family History   Problem Relation Age of Onset    Alzheimer's disease Mother     Dementia Mother     Heart attack Father     Prostate cancer Father      Social History     Socioeconomic History    Marital status: /Civil Union     Spouse name: Not on file    Number of children: Not on file    Years of education: Not on file    Highest education level: Not on file   Occupational History    Not on file   Tobacco Use    Smoking status: Never Smoker    Smokeless tobacco: Never Used   Vaping Use    Vaping Use: Never used   Substance and Sexual Activity    Alcohol use: Yes     Comment: occasionally    Drug use: No    Sexual activity: Yes   Other Topics Concern    Not on file   Social History Narrative    Consumes caffeine, 1 cup/day    Soda     Social Determinants of Health     Financial Resource Strain: Not on file   Food Insecurity: Not on file   Transportation Needs: Not on file   Physical Activity: Not on file   Stress: Not on file   Social Connections: Not on file   Intimate Partner Violence: Not on file   Housing Stability: Not on file       Current Outpatient Medications:     aspirin 81 MG tablet, Take 81 mg by mouth daily (Patient not taking: Reported on 1/24/2022 ), Disp: , Rfl:     bimatoprost (LUMIGAN) 0 03 % ophthalmic drops, Administer 1 drop to the right eye daily at bedtime , Disp: , Rfl:     COMBIGAN 0 2-0 5 %, INSTILL 1 DROP INTO RIGHT EYE TWICE A DAY, Disp: , Rfl: 6    Cyanocobalamin 1000 MCG CAPS, Take 1,000 mcg by mouth daily, Disp: , Rfl:     rosuvastatin (CRESTOR) 10 MG tablet, TAKE 1 TABLET BY MOUTH EVERY DAY, Disp: 90 tablet, Rfl: 3    sildenafil (VIAGRA) 100 mg tablet, Take 1 tablet (100 mg total) by mouth daily as needed for erectile dysfunction, Disp: 30 tablet, Rfl: 1    Current Facility-Administered Medications:     cefTRIAXone (ROCEPHIN) injection 1,000 mg, 1,000 mg, Intravenous, Q24H, Julio C Rojas MD, 1,000 mg at 09/01/20 1129  No Known Allergies  There were no vitals filed for this visit

## 2022-05-04 NOTE — PROGRESS NOTES
Follow-up - Radiation Oncology   Justin Marroquin 1940 [de-identified] y o  male [id number]      History of Present Illness   Cancer Staging  No matching staging information was found for the patient  Justin Marroquin 1940 is a [de-identified] y o  male  presents today for follow up for [place] 7(3+4) prostate cancer  He completed radiation to the prostate on 2020  He was last seen 2021  Today's visit is a 6 month follow-up     22  Testosterone     200                 PSA                  0 05     22  Urology  PSA remains low and stable  F/u in 6 months with PSA     Upcomin22  Urology           Interval History:  No urinary or bowel complaints  Historical Information   Oncology History   Prostate cancer ([facility] [address] )   2020 Biopsy    A  Prostate, r apex x5 (MRI Fusion), needle biopsy:      -Prostatic adenocarcinoma  -[place] score: 3+3 = 6  -Tumor extent: tumor involves all five submitted cores  -Tumor volume: tumor involves  10%, 60% , 60,%, 70%, 80% of each core biopsy   -Perineural invasion:  Present, few foci     B  Prostate, l lat base needle biopsy:      -Small focus of adenocarcinoma of the prostate  - [place] grade 3+3 = 6   -Tumor extent: single focus in one submitted core  -Tumor volume: tumor involves approximately 5% of biopsy volume   -Perineural invasion: not identified     C  Prostate, l lat mid needle biopsy:      -Prostatic adenocarcinoma  - Felipe grade 3+3=6   -Tumor extent: single focus in one submitted core  -Tumor volume: tumor involves approximately 15% of biopsy volume   -Perineural invasion: not identified     D   Prostate, l lat apex needle biopsy:    -Benign prostatic tissue     E  Prostate, l base needle biopsy:      Prostatic adenocarcinoma  - [place] grade 3+ 4 = 7   -Tumor extent: single focus in one submitted core  -Tumor volume: tumor  involves approximately 20% of biopsy volume   -Perineural invasion: Present     F  Prostate, l mid needle biopsy:      Prostatic adenocarcinoma  - [place] grade 3+ 3=6   -Tumor extent: single focus in one submitted core  -Tumor volume: tumor discontinuously involves 50% of biopsy volume   -Perineural invasion: not identified     G  Prostate, l apex needle biopsy:    -Benign prostatic tissue     H  Prostate, r lat base needle biopsy:      Prostatic adenocarcinoma  - [place] grade 3+ 4 = 7   -Tumor extent: single focus in one submitted core  -Tumor volume: tumor involves approximately 20% of biopsy volume   -Perineural invasion: not identified     I  Prostate, r lat mid needle biopsy:    -Benign prostatic tissue     J  Prostate, r lat apex needle biopsy:    -Benign prostatic tissue     K  Prostate, r base needle biopsy:      Prostatic adenocarcinoma  - [place] grade 3+ 3=6   -Tumor extent: single focus in one submitted core  -Tumor volume: tumor discontinuously involves 50% of biopsy volume   -Perineural invasion: not identified     L  Prostate, r mid needle biopsy:    -Benign prostatic tissue     M  Prostate, r apex needle biopsy:    -Benign prostatic tissue with mild acute & chronic inflammation        8/3/2020 Initial Diagnosis    Prostate cancer ([facility] [address] )     10/22/2020 - 12/28/2020 Radiation    Treatments:  Course: C1    Plan ID Energy Fractions Dose per Fraction (cGy) Dose Correction (cGy) Total Dose Delivered (cGy) Elapsed Days   CD Prost_SV 10X 19 / 19 180 0 3,420 33   Whole Pelvis 10X 25 / 25 180 0 4,500 33      Treatment dates:  10/22/2020 - 12/28/2020           Past Medical History:   Diagnosis Date    BPH with obstruction/lower urinary tract symptoms 2015    Elevated PSA 2012    Frequency of micturition 2015    Gallstone     Hypercholesteremia 2013    Incomplete emptying of bladder 2015    Microhematuria 2016    Nocturia 2012    Poor urinary stream 2015    Prostate [facility])      Past Surgical History:   Procedure Laterality Date    CATARACT EXTRACTION Right 2010    GALLBLADDER SURGERY  12/10/2015    PROSTATE BIOPSY  2011 Social History   Social History     Substance and Sexual Activity   Alcohol Use Yes    Comment: occasionally     Social History     Substance and Sexual Activity   Drug Use No     Social History     Tobacco Use   Smoking Status Never Smoker   Smokeless Tobacco Never Used         Meds/Allergies     Current Outpatient Medications:     bimatoprost (LUMIGAN) 0 03 % ophthalmic drops, Administer 1 drop to the right eye daily at bedtime , Disp: , Rfl:     COMBIGAN 0 2-0 5 %, INSTILL 1 DROP INTO RIGHT EYE TWICE A DAY, Disp: , Rfl: 6    Cyanocobalamin 1000 MCG CAPS, Take 1,000 mcg by mouth daily, Disp: , Rfl:     rosuvastatin (CRESTOR) 10 MG tablet, TAKE 1 TABLET BY MOUTH EVERY DAY, Disp: 90 tablet, Rfl: 3    sildenafil (VIAGRA) 100 mg tablet, Take 1 tablet (100 mg total) by mouth daily as needed for erectile dysfunction, Disp: 30 tablet, Rfl: 1    aspirin 81 MG tablet, Take 81 mg by mouth daily (Patient not taking: Reported on 1/24/2022 ), Disp: , Rfl:     Current Facility-Administered Medications:     cefTRIAXone (ROCEPHIN) injection 1,000 mg, 1,000 mg, Intravenous, Q24H, Fabiola Gustafson MD, 1,000 mg at 09/01/20 1129  No Known Allergies      Review of Systems   Constitutional: Negative for activity change, fever and unexpected weight change  HENT: Negative for sneezing and sore throat  Eyes: Negative  Respiratory: Negative for cough and shortness of breath  Cardiovascular: Negative for chest pain, palpitations and leg swelling  Gastrointestinal: Negative for abdominal pain, blood in stool and rectal pain  Endocrine: Negative  Genitourinary: Negative for difficulty urinating, dysuria, frequency and hematuria  Musculoskeletal: Negative for arthralgias, back pain, gait problem and joint swelling  Skin: Negative  Allergic/Immunologic: Negative  Neurological: Negative for dizziness, weakness, numbness and headaches  Hematological: Negative  Psychiatric/Behavioral: Negative  OBJECTIVE:   /75 (BP Location: Left arm, Patient Position: Sitting, Cuff Size: Large)   Pulse 70   Temp (!) 97 3 °F (36 3 °C) (Temporal)   Resp 17   Ht 5' 10" (1 778 m)   Wt 95 1 kg (209 lb 10 5 oz)   SpO2 98%   BMI 30 08 kg/m²   Pain Assessment:  0  Karnofsky: 100 - Fully active, able to carry on all pre-disease performed without restriction    Physical Exam  Constitutional:       Appearance: Normal appearance  HENT:      Nose: No congestion  Mouth/Throat:      Pharynx: Oropharynx is clear  Eyes:      Extraocular Movements: Extraocular movements intact  Pupils: Pupils are equal, round, and reactive to light  Cardiovascular:      Rate and Rhythm: Normal rate and regular rhythm  Heart sounds: Normal heart sounds  Pulmonary:      Effort: Pulmonary effort is normal       Breath sounds: Normal breath sounds  Abdominal:      Palpations: Abdomen is soft  There is no mass  Tenderness: There is no abdominal tenderness  Genitourinary:     Comments: EMMA reveals a moderately enlarged prostate gland which is smooth with no nodules or masses  Musculoskeletal:         General: No swelling or tenderness  Normal range of motion  Cervical back: Normal range of motion and neck supple  Skin:     General: Skin is dry  Findings: No lesion or rash  Neurological:      General: No focal deficit present  Mental Status: He is alert and oriented to person, place, and time  Psychiatric:         Mood and Affect: Mood normal          Behavior: Behavior normal               RESULTS    Lab Results: No results found for this or any previous visit (from the past 672 hour(s))  Imaging Studies:No results found  Assessment/Plan:  No orders of the defined types were placed in this encounter  Bina Arteaga is a [de-identified]y o  year old male with stage II [place] 7 3+4 adenocarcinoma prostate was treated with IMRT plust 1 cycle of Lupron a year and a half ago        Will order for him today PSA  His last PSA was January of this year and was 0 05  Will see him again in 1 year  Dorene Habermann, MD  5/4/2022,10:22 AM    Portions of the record may have been created with voice recognition software   Occasional wrong word or "sound a like" substitutions may have occurred due to the inherent limitations of voice recognition software   Read the chart carefully and recognize, using context, where substitutions have occurred

## 2022-05-18 ENCOUNTER — APPOINTMENT (OUTPATIENT)
Dept: RADIATION ONCOLOGY | Facility: CLINIC | Age: 82
End: 2022-05-18
Attending: RADIOLOGY
Payer: COMMERCIAL

## 2022-05-22 DIAGNOSIS — E78.2 MIXED HYPERLIPIDEMIA: ICD-10-CM

## 2022-05-23 RX ORDER — ROSUVASTATIN CALCIUM 10 MG/1
TABLET, COATED ORAL
Qty: 90 TABLET | Refills: 3 | Status: SHIPPED | OUTPATIENT
Start: 2022-05-23

## 2022-08-02 LAB — HBA1C MFR BLD: 5.4 % OF TOTAL HGB

## 2022-08-12 ENCOUNTER — OFFICE VISIT (OUTPATIENT)
Dept: FAMILY MEDICINE CLINIC | Facility: CLINIC | Age: 82
End: 2022-08-12
Payer: COMMERCIAL

## 2022-08-12 VITALS
DIASTOLIC BLOOD PRESSURE: 64 MMHG | RESPIRATION RATE: 16 BRPM | SYSTOLIC BLOOD PRESSURE: 128 MMHG | TEMPERATURE: 96.3 F | BODY MASS INDEX: 29.63 KG/M2 | WEIGHT: 207 LBS | OXYGEN SATURATION: 98 % | HEART RATE: 64 BPM | HEIGHT: 70 IN

## 2022-08-12 DIAGNOSIS — U07.1 COVID-19: ICD-10-CM

## 2022-08-12 DIAGNOSIS — E78.2 MIXED HYPERLIPIDEMIA: ICD-10-CM

## 2022-08-12 DIAGNOSIS — D75.89 MACROCYTOSIS: ICD-10-CM

## 2022-08-12 DIAGNOSIS — D18.03 LIVER HEMANGIOMA: ICD-10-CM

## 2022-08-12 DIAGNOSIS — Z00.00 MEDICARE ANNUAL WELLNESS VISIT, SUBSEQUENT: Primary | ICD-10-CM

## 2022-08-12 DIAGNOSIS — E78.6 LOW HDL (UNDER 40): ICD-10-CM

## 2022-08-12 DIAGNOSIS — C61 PROSTATE CANCER (HCC): ICD-10-CM

## 2022-08-12 DIAGNOSIS — Z23 IMMUNIZATION DUE: ICD-10-CM

## 2022-08-12 DIAGNOSIS — R73.9 ELEVATED BLOOD SUGAR: ICD-10-CM

## 2022-08-12 PROCEDURE — 1125F AMNT PAIN NOTED PAIN PRSNT: CPT | Performed by: FAMILY MEDICINE

## 2022-08-12 PROCEDURE — 1170F FXNL STATUS ASSESSED: CPT | Performed by: FAMILY MEDICINE

## 2022-08-12 PROCEDURE — 1160F RVW MEDS BY RX/DR IN RCRD: CPT | Performed by: FAMILY MEDICINE

## 2022-08-12 PROCEDURE — 3288F FALL RISK ASSESSMENT DOCD: CPT | Performed by: FAMILY MEDICINE

## 2022-08-12 PROCEDURE — 99214 OFFICE O/P EST MOD 30 MIN: CPT | Performed by: FAMILY MEDICINE

## 2022-08-12 PROCEDURE — G0439 PPPS, SUBSEQ VISIT: HCPCS | Performed by: FAMILY MEDICINE

## 2022-08-12 PROCEDURE — 3725F SCREEN DEPRESSION PERFORMED: CPT | Performed by: FAMILY MEDICINE

## 2022-08-12 RX ORDER — BIMATOPROST 0.01 %
DROPS OPHTHALMIC (EYE)
COMMUNITY
Start: 2022-05-22

## 2022-08-12 NOTE — PROGRESS NOTES
Assessment/Plan:       No problem-specific Assessment & Plan notes found for this encounter  Diagnoses and all orders for this visit:    Medicare annual wellness visit, subsequent    Mixed hyperlipidemia  Comments: To follow with low-fat diet  Orders:  -     Lipid Panel with Direct LDL reflex; Future  -     CBC and differential; Future  -     Comprehensive metabolic panel; Future    Low HDL (under 40)  Comments:  Advised to walk 20 minutes daily    Prostate cancer ([address] )  Comments: Following with urology    Macrocytosis  -     CBC and differential; Future    Elevated blood sugar  Comments:  good control  To follow with low sweet and carbohydrate diet    COVID-19  Comments:  Patient recovered    Liver hemangioma  -     US abdomen limited; Future    Immunization due  -     Cancel: Pneumococcal Conjugate Vaccine 20-valent (Pcv20)    Other orders  -     Lumigan 0 01 % ophthalmic drops; INSTILL 1 DROP EVERY DAY INTO RIGHT EYE        There are no Patient Instructions on file for this visit  Orders Placed This Encounter   Procedures    US abdomen limited     Standing Status:   Future     Standing Expiration Date:   8/12/2026    Lipid Panel with Direct LDL reflex     This is a patient instruction: This test requires patient fasting for 10-12 hours or longer  Drinking of black coffee or black tea is acceptable  Standing Status:   Future     Standing Expiration Date:   8/12/2023    CBC and differential     This is a patient instruction: This test is non-fasting  Please drink two glasses of water morning of bloodwork  Standing Status:   Future     Standing Expiration Date:   8/12/2023    Comprehensive metabolic panel     This is a patient instruction: Patient fasting for 8 hours or longer recommended  Standing Status:   Future     Standing Expiration Date:   8/12/2023         Subjective:     Patient ID: Roxane Weaver is a [de-identified] y o  male      HPI   Office visit follow-up chronic medical problem  Hyperlipidemia admit to regular fat intake  Denied side effect with Lipitor  Elevated blood sugar  Denied polyuria or polydipsia   Prostate cancer doing well following with Urology  Liver hemangioma  Denied abdominal pain  Patient stated he had COVID about 6 weeks ago all symptoms resolved his symptom was mild and cold-like symptoms    Test results  Labs 8-2-22  Discussed with patient  Review of Systems   Constitutional: Negative for activity change, appetite change, chills, fatigue, fever and unexpected weight change  HENT: Negative for congestion, ear discharge, ear pain, hearing loss, nosebleeds, rhinorrhea, sinus pressure, sore throat, tinnitus, trouble swallowing and voice change  Eyes: Negative for photophobia, pain and visual disturbance  Respiratory: Negative for cough, chest tightness, shortness of breath and wheezing  Cardiovascular: Negative for chest pain, palpitations and leg swelling  Gastrointestinal: Negative for abdominal pain, anal bleeding, blood in stool, constipation, diarrhea, nausea and vomiting  Endocrine: Negative for cold intolerance, heat intolerance, polydipsia and polyuria  Genitourinary: Negative for dysuria, frequency, hematuria, penile swelling and urgency  Musculoskeletal: Negative for arthralgias, back pain, gait problem, joint swelling, myalgias and neck pain  Skin: Negative for rash  Neurological: Negative for dizziness, tremors, seizures, syncope, facial asymmetry, weakness, light-headedness, numbness and headaches  Hematological: Negative for adenopathy  Does not bruise/bleed easily  Psychiatric/Behavioral: Negative for agitation, behavioral problems, confusion, dysphoric mood, hallucinations and sleep disturbance  The patient is not nervous/anxious  Objective:     Physical Exam  Constitutional:       General: He is not in acute distress  Appearance: Normal appearance  He is well-developed  He is not ill-appearing     HENT: Head: Normocephalic  Eyes:      General: No scleral icterus  Right eye: No discharge  Left eye: No discharge  Pupils: Pupils are equal, round, and reactive to light  Neck:      Thyroid: No thyromegaly  Vascular: No carotid bruit or JVD  Cardiovascular:      Rate and Rhythm: Normal rate and regular rhythm  Heart sounds: Normal heart sounds  No murmur heard  No gallop  Pulmonary:      Effort: Pulmonary effort is normal       Breath sounds: Normal breath sounds  Abdominal:      General: Bowel sounds are normal  There is no distension  Palpations: Abdomen is soft  There is no mass  Tenderness: There is no abdominal tenderness  There is no guarding or rebound  Musculoskeletal:         General: No swelling or tenderness  Normal range of motion  Cervical back: Neck supple  Right lower leg: No edema  Left lower leg: No edema  Lymphadenopathy:      Cervical: No cervical adenopathy  Skin:     Coloration: Skin is not pale  Findings: No rash  Neurological:      General: No focal deficit present  Mental Status: He is alert and oriented to person, place, and time  Cranial Nerves: No cranial nerve deficit  Motor: No weakness or abnormal muscle tone  Coordination: Coordination normal       Gait: Gait normal    Psychiatric:         Mood and Affect: Mood normal          Behavior: Behavior normal          Thought Content:  Thought content normal

## 2022-08-12 NOTE — PROGRESS NOTES
Asha Alvarenga is here for his Subsequent Wellness visit  Health Risk Assessment:   Patient rates overall health as very good  Patient feels that their physical health rating is same  Patient is very satisfied with their life  Eyesight was rated as same  Patient feels that their emotional and mental health rating is same  Patients states they are never, rarely angry  Patient states they are never, rarely unusually tired/fatigued  Pain experienced in the last 7 days has been none  Patient states that he has experienced no weight loss or gain in last 6 months  Depression Screening:   PHQ-2 Score: 0      Fall Risk Screening: In the past year, patient has experienced: no history of falling in past year      Home Safety:  Patient does not have trouble with stairs inside or outside of their home  Patient has working smoke alarms and has working carbon monoxide detector  Home safety hazards include: none  Nutrition:   Current diet is Regular  Medications:   Patient is currently taking over-the-counter supplements  OTC medications include: see medication list  Patient is able to manage medications  Activities of Daily Living (ADLs)/Instrumental Activities of Daily Living (IADLs):   Walk and transfer into and out of bed and chair?: Yes  Dress and groom yourself?: Yes    Bathe or shower yourself?: Yes    Feed yourself?  Yes  Do your laundry/housekeeping?: Yes  Manage your money, pay your bills and track your expenses?: Yes  Make your own meals?: Yes    Do your own shopping?: Yes    Previous Hospitalizations:   Any hospitalizations or ED visits within the last 12 months?: No      Advance Care Planning:   Living will: Yes    Advanced directive: Yes    Five wishes given: Yes      Cognitive Screening:   Provider or family/friend/caregiver concerned regarding cognition?: No    PREVENTIVE SCREENINGS      Cardiovascular Screening:    General: History Lipid Disorder      Diabetes Screening:     General: Screening Current      Colorectal Cancer Screening:     General: Risks and Benefits Discussed and Patient Declines      Prostate Cancer Screening:    General: History Prostate Cancer, Screening Not Indicated and Screening Current      Osteoporosis Screening:    General: Screening Not Indicated      Abdominal Aortic Aneurysm (AAA) Screening:        General: Screening Not Indicated      Lung Cancer Screening:     General: Screening Not Indicated      Hepatitis C Screening:    General: Risks and Benefits Discussed and Patient Declines    Screening, Brief Intervention, and Referral to Treatment (SBIRT)    Screening    Typical number of drinks in a week: 1    Single Item Drug Screening:  How often have you used an illegal drug (including marijuana) or a prescription medication for non-medical reasons in the past year? never    Single Item Drug Screen Score: 0  Interpretation: Negative screen for possible drug use disorder    Other Counseling Topics:   Car/seat belt/driving safety, skin self-exam, sunscreen and regular weightbearing exercise and calcium and vitamin D intake

## 2022-09-02 ENCOUNTER — OFFICE VISIT (OUTPATIENT)
Dept: UROLOGY | Facility: MEDICAL CENTER | Age: 82
End: 2022-09-02
Payer: COMMERCIAL

## 2022-09-02 VITALS
BODY MASS INDEX: 29.2 KG/M2 | HEART RATE: 76 BPM | WEIGHT: 204 LBS | HEIGHT: 70 IN | DIASTOLIC BLOOD PRESSURE: 70 MMHG | SYSTOLIC BLOOD PRESSURE: 120 MMHG

## 2022-09-02 DIAGNOSIS — C61 PROSTATE CANCER (HCC): ICD-10-CM

## 2022-09-02 DIAGNOSIS — N40.0 BENIGN PROSTATIC HYPERPLASIA WITHOUT LOWER URINARY TRACT SYMPTOMS: Primary | ICD-10-CM

## 2022-09-02 PROCEDURE — 99214 OFFICE O/P EST MOD 30 MIN: CPT | Performed by: PHYSICIAN ASSISTANT

## 2022-09-02 PROCEDURE — 81003 URINALYSIS AUTO W/O SCOPE: CPT | Performed by: PHYSICIAN ASSISTANT

## 2022-09-02 PROCEDURE — 1160F RVW MEDS BY RX/DR IN RCRD: CPT | Performed by: PHYSICIAN ASSISTANT

## 2022-09-02 NOTE — PROGRESS NOTES
9/2/2022      Chief Complaint   Patient presents with    Prostate Cancer         Assessment and Plan    [de-identified] y o  male managed by our office    1  Prostate cancer  · [place] 3+3=6 in 5/12 cores and [place] 3+4=7 in 2/12 cores  · MRI with PIRADs 4 on 4/03/2020  · S/p radiation completed 12/28/2020  · S/p 1 dose of Lurpon 8/20/2020  · PSA: 0 09 (8/2/22)   ? Previous PSA: 0 05 (1/17/22), <0 1 (7/20/21), 1 3 (11/2/2020), 9 0 (3/18/2020)   · EMMA with Rad Onc in May was benign  · AUA score: 13   · Follow up in 6 months with PSA       2  Radiation cystitis  · Urine today: trace blood   · Episode in December 2021  · CT renal protocol benign at that time  · Denies any subsequent episodes    History of Present Illness  Janina Ribeiro is a [de-identified] y o  male here for evaluation of  Patient MRI of the prostate on 04/03/2020 for evaluation of elevated PSA  MRI revealed PI-RADS 4  Prostate biopsy on 06/16/2020 revealed [place] 3+3=6 in 5/12 cores and [place] 3+4=7 in 2/12 cores  Patient is s/p 1 dose of neoadjuvant Lupron on 08/20/2020 in completed radiation therapy on 12/28/2020  His PSA remains low and stable, please see trend above  Patient did have 1 episode of radiation cystitis in December 2021 that lasted for 24 hours  CT renal protocol was ordered which revealed prostatic changes compatible with radiation therapy without evidence of metastatic disease  Patient denies any subsequent episodes of gross hematuria  Patient denies any changes since his previous visit in January 2022  He did follow-up with Rad Onc in May of this year and had EMMA performed  He notes nocturia twice per night occasional urinary urgency but otherwise denies any urinary symptoms  He denies any fevers, chills, fatigue, night sweats, or unexpected weight change  He is agreeable to plan above  Review of Systems   Constitutional: Negative for chills, diaphoresis, fatigue and fever  HENT: Negative  Respiratory: Negative  Cardiovascular: Negative  Gastrointestinal: Negative for abdominal pain, nausea and vomiting  Genitourinary: Negative for difficulty urinating, dysuria, flank pain, frequency, hematuria, scrotal swelling, testicular pain and urgency (occasionally)  Nocturia 2x per night  Denies sensation of incomplete bladder emptying  Denies weak or intermittent stream     Musculoskeletal: Negative  Skin: Negative  Neurological: Negative  AUA SYMPTOM SCORE    Flowsheet Row Most Recent Value   AUA SYMPTOM SCORE    How often have you had a sensation of not emptying your bladder completely after you finished urinating? 2   How often have you had to urinate again less than two hours after you finished urinating? 3   How often have you found you stopped and started again several times when you urinate? 1   How often have you found it difficult to postpone urination? 2   How often have you had a weak urinary stream? 2   How often have you had to push or strain to begin urination? 1   How many times did you most typically get up to urinate from the time you went to bed at night until the time you got up in the morning? 2   Quality of Life: If you were to spend the rest of your life with your urinary condition just the way it is now, how would you feel about that? 2   AUA SYMPTOM SCORE 13           Vitals  Vitals:    09/02/22 1050   BP: 120/70   Pulse: 76   Weight: 92 5 kg (204 lb)   Height: 5' 10" (1 778 m)       Physical Exam  Vitals reviewed  Constitutional:       General: He is not in acute distress  Appearance: Normal appearance  He is not ill-appearing, toxic-appearing or diaphoretic  HENT:      Head: Normocephalic and atraumatic  Eyes:      Conjunctiva/sclera: Conjunctivae normal    Pulmonary:      Effort: Pulmonary effort is normal  No respiratory distress  Abdominal:      General: There is no distension  Palpations: Abdomen is soft  Tenderness: There is no abdominal tenderness  There is no right CVA tenderness, left CVA tenderness, guarding or rebound  Genitourinary:     Comments: EMMA performed by Rad Onc in May 2022  Musculoskeletal:         General: Normal range of motion  Cervical back: Normal range of motion  Skin:     General: Skin is warm and dry  Neurological:      General: No focal deficit present  Mental Status: He is alert and oriented to person, place, and time  Psychiatric:         Mood and Affect: Mood normal          Behavior: Behavior normal          Thought Content: Thought content normal          Judgment: Judgment normal        Past History  Past Medical History:   Diagnosis Date    BPH with obstruction/lower urinary tract symptoms 2015    Elevated PSA 2012    Frequency of micturition 2015    Gallstone     Hypercholesteremia 2013    Incomplete emptying of bladder 2015    Microhematuria 2016    Nocturia 2012    Poor urinary stream 2015    Prostate cancer ([facility] [address] )      Social History     Socioeconomic History    Marital status: /Civil Union     Spouse name: None    Number of children: None    Years of education: None    Highest education level: None   Occupational History    None   Tobacco Use    Smoking status: Never Smoker    Smokeless tobacco: Never Used   Vaping Use    Vaping Use: Never used   Substance and Sexual Activity    Alcohol use: Yes     Comment: occasionally    Drug use: No    Sexual activity: Yes   Other Topics Concern    None   Social History Narrative    Consumes caffeine, 1 cup/day    Soda     Social Determinants of Health     Financial Resource Strain: Low Risk     Difficulty of Paying Living Expenses: Not hard at all   Food Insecurity: Not on file   Transportation Needs: No Transportation Needs    Lack of Transportation (Medical): No    Lack of Transportation (Non-Medical):  No   Physical Activity: Not on file   Stress: Not on file   Social Connections: Not on file   Intimate Partner Violence: Not on file Housing Stability: Not on file     Social History     Tobacco Use   Smoking Status Never Smoker   Smokeless Tobacco Never Used     Family History   Problem Relation Age of Onset    Alzheimer's disease Mother     Dementia Mother     Heart attack Father     Prostate cancer Father        The following portions of the patient's history were reviewed and updated as appropriate: allergies, current medications, past medical history, past social history, past surgical history and problem list     Results  Recent Results (from the past 1 hour(s))   POCT urine dip auto non-scope    Collection Time: 09/02/22 10:58 AM   Result Value Ref Range     COLOR,UA yellow     CLARITY,UA clear     SPECIFIC GRAVITY,UA 1 020      PH,UA 5 5     LEUKOCYTE ESTERASE,UA n     NITRITE,UA n     GLUCOSE, UA n     KETONES,UA n     BILIRUBIN,UA n     BLOOD,UA tr     POCT URINE PROTEIN tr     SL AMB POCT UROBILINOGEN 0 2    ]  Lab Results   Component Value Date    PSA 1 3 11/02/2020     Lab Results   Component Value Date    CALCIUM 9 5 12/21/2021    K 4 3 12/21/2021    CO2 29 12/21/2021     12/21/2021    BUN 15 12/21/2021    CREATININE 1 04 12/21/2021     Lab Results   Component Value Date    WBC 4 3 01/13/2021    HGB 13 7 01/13/2021    HCT 41 1 01/13/2021    MCV 99 3 01/13/2021     01/13/2021     Gloria Dave PA-C

## 2023-01-09 ENCOUNTER — RA CDI HCC (OUTPATIENT)
Dept: OTHER | Facility: HOSPITAL | Age: 83
End: 2023-01-09

## 2023-01-09 NOTE — PROGRESS NOTES
[facility]  coding opportunities       Chart reviewed, no opportunity found: CHART REVIEWED, NO OPPORTUNITY FOUND      This is a reminder to address ALL HCC (risk adjustment) codes as found on active problem list for 2023 as patient scores reset to zero TERRY    Patients Insurance     Medicare Insurance: [employer]

## 2023-01-10 LAB
ALBUMIN SERPL-MCNC: 4 G/DL (ref 3.6–5.1)
ALBUMIN/GLOB SERPL: 1.6 (CALC) (ref 1–2.5)
ALP SERPL-CCNC: 75 U/L (ref 35–144)
ALT SERPL-CCNC: 13 U/L (ref 9–46)
AST SERPL-CCNC: 15 U/L (ref 10–35)
BASOPHILS # BLD AUTO: 37 CELLS/UL (ref 0–200)
BASOPHILS NFR BLD AUTO: 0.6 %
BILIRUB SERPL-MCNC: 1.3 MG/DL (ref 0.2–1.2)
BUN SERPL-MCNC: 16 MG/DL (ref 7–25)
BUN/CREAT SERPL: ABNORMAL (CALC) (ref 6–22)
CALCIUM SERPL-MCNC: 9.6 MG/DL (ref 8.6–10.3)
CHLORIDE SERPL-SCNC: 106 MMOL/L (ref 98–110)
CHOLEST SERPL-MCNC: 152 MG/DL
CHOLEST/HDLC SERPL: 3.7 (CALC)
CO2 SERPL-SCNC: 31 MMOL/L (ref 20–32)
CREAT SERPL-MCNC: 1 MG/DL (ref 0.7–1.22)
EOSINOPHIL # BLD AUTO: 254 CELLS/UL (ref 15–500)
EOSINOPHIL NFR BLD AUTO: 4.1 %
ERYTHROCYTE [DISTWIDTH] IN BLOOD BY AUTOMATED COUNT: 12.7 % (ref 11–15)
GFR/BSA.PRED SERPLBLD CYS-BASED-ARV: 76 ML/MIN/1.73M2
GLOBULIN SER CALC-MCNC: 2.5 G/DL (CALC) (ref 1.9–3.7)
GLUCOSE SERPL-MCNC: 95 MG/DL (ref 65–99)
HCT VFR BLD AUTO: 43.7 % (ref 38.5–50)
HDLC SERPL-MCNC: 41 MG/DL
HGB BLD-MCNC: 15.4 G/DL (ref 13.2–17.1)
LDLC SERPL CALC-MCNC: 85 MG/DL (CALC)
LYMPHOCYTES # BLD AUTO: 1519 CELLS/UL (ref 850–3900)
LYMPHOCYTES NFR BLD AUTO: 24.5 %
MCH RBC QN AUTO: 33.4 PG (ref 27–33)
MCHC RBC AUTO-ENTMCNC: 35.2 G/DL (ref 32–36)
MCV RBC AUTO: 94.8 FL (ref 80–100)
MONOCYTES # BLD AUTO: 657 CELLS/UL (ref 200–950)
MONOCYTES NFR BLD AUTO: 10.6 %
NEUTROPHILS # BLD AUTO: 3732 CELLS/UL (ref 1500–7800)
NEUTROPHILS NFR BLD AUTO: 60.2 %
NONHDLC SERPL-MCNC: 111 MG/DL (CALC)
PLATELET # BLD AUTO: 273 THOUSAND/UL (ref 140–400)
PMV BLD REES-ECKER: 10 FL (ref 7.5–12.5)
POTASSIUM SERPL-SCNC: 4.5 MMOL/L (ref 3.5–5.3)
PROT SERPL-MCNC: 6.5 G/DL (ref 6.1–8.1)
RBC # BLD AUTO: 4.61 MILLION/UL (ref 4.2–5.8)
SODIUM SERPL-SCNC: 141 MMOL/L (ref 135–146)
TRIGL SERPL-MCNC: 166 MG/DL
WBC # BLD AUTO: 6.2 THOUSAND/UL (ref 3.8–10.8)

## 2023-01-16 ENCOUNTER — OFFICE VISIT (OUTPATIENT)
Dept: FAMILY MEDICINE CLINIC | Facility: CLINIC | Age: 83
End: 2023-01-16

## 2023-01-16 VITALS
OXYGEN SATURATION: 98 % | HEART RATE: 58 BPM | BODY MASS INDEX: 30.38 KG/M2 | WEIGHT: 212.2 LBS | TEMPERATURE: 97.1 F | SYSTOLIC BLOOD PRESSURE: 126 MMHG | DIASTOLIC BLOOD PRESSURE: 78 MMHG | HEIGHT: 70 IN

## 2023-01-16 DIAGNOSIS — C61 PROSTATE CANCER (HCC): ICD-10-CM

## 2023-01-16 DIAGNOSIS — N40.1 BENIGN PROSTATIC HYPERPLASIA WITH NOCTURIA: ICD-10-CM

## 2023-01-16 DIAGNOSIS — E78.2 MIXED HYPERLIPIDEMIA: Primary | ICD-10-CM

## 2023-01-16 DIAGNOSIS — R35.1 BENIGN PROSTATIC HYPERPLASIA WITH NOCTURIA: ICD-10-CM

## 2023-01-16 DIAGNOSIS — E66.9 OBESITY (BMI 30-39.9): ICD-10-CM

## 2023-01-16 PROBLEM — D75.89 MACROCYTIC: Status: RESOLVED | Noted: 2018-10-16 | Resolved: 2023-01-16

## 2023-01-16 PROBLEM — E66.3 OVER WEIGHT: Status: RESOLVED | Noted: 2021-08-11 | Resolved: 2023-01-16

## 2023-01-16 PROBLEM — D18.03 LIVER HEMANGIOMA: Status: RESOLVED | Noted: 2019-11-29 | Resolved: 2023-01-16

## 2023-01-16 PROBLEM — E78.00 PURE HYPERCHOLESTEROLEMIA: Status: RESOLVED | Noted: 2019-11-29 | Resolved: 2023-01-16

## 2023-01-16 PROBLEM — E78.6 LOW HDL (UNDER 40): Status: RESOLVED | Noted: 2020-12-11 | Resolved: 2023-01-16

## 2023-01-16 PROBLEM — Z28.20 IMMUNIZATION NOT CARRIED OUT BECAUSE OF PATIENT DECISION: Status: RESOLVED | Noted: 2020-12-11 | Resolved: 2023-01-16

## 2023-01-16 PROBLEM — Z23 IMMUNIZATION DUE: Status: RESOLVED | Noted: 2021-08-11 | Resolved: 2023-01-16

## 2023-01-16 PROBLEM — H40.10X0 OPEN-ANGLE GLAUCOMA: Status: ACTIVE | Noted: 2023-01-16

## 2023-01-16 PROBLEM — R31.29 MICROSCOPIC HEMATURIA: Status: RESOLVED | Noted: 2019-02-12 | Resolved: 2023-01-16

## 2023-01-16 PROBLEM — D75.89 MACROCYTOSIS: Status: RESOLVED | Noted: 2019-11-29 | Resolved: 2023-01-16

## 2023-01-16 PROBLEM — R63.5 WEIGHT GAIN: Status: RESOLVED | Noted: 2021-12-15 | Resolved: 2023-01-16

## 2023-01-16 NOTE — PATIENT INSTRUCTIONS
Appears to be in excellent health  Past history reviewed along with medications  Immunizations are up-to-date  Recheck in 6 months
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
## 2023-03-15 LAB — PSA SERPL-MCNC: 0.13 NG/ML

## 2023-04-05 ENCOUNTER — OFFICE VISIT (OUTPATIENT)
Dept: UROLOGY | Facility: MEDICAL CENTER | Age: 83
End: 2023-04-05

## 2023-04-05 VITALS
BODY MASS INDEX: 30.35 KG/M2 | OXYGEN SATURATION: 100 % | DIASTOLIC BLOOD PRESSURE: 76 MMHG | SYSTOLIC BLOOD PRESSURE: 128 MMHG | HEIGHT: 70 IN | HEART RATE: 74 BPM | WEIGHT: 212 LBS

## 2023-04-05 DIAGNOSIS — C61 PROSTATE CANCER (HCC): Primary | ICD-10-CM
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
## 2023-05-09 ENCOUNTER — RADIATION ONCOLOGY FOLLOW-UP (OUTPATIENT)
Dept: RADIATION ONCOLOGY | Facility: CLINIC | Age: 83
End: 2023-05-09
Attending: RADIOLOGY

## 2023-05-09 ENCOUNTER — CLINICAL SUPPORT (OUTPATIENT)
Dept: RADIATION ONCOLOGY | Facility: CLINIC | Age: 83
End: 2023-05-09
Attending: RADIOLOGY

## 2023-05-09 VITALS
OXYGEN SATURATION: 99 % | BODY MASS INDEX: 30.11 KG/M2 | DIASTOLIC BLOOD PRESSURE: 75 MMHG | TEMPERATURE: 97 F | WEIGHT: 209.88 LBS | HEART RATE: 62 BPM | SYSTOLIC BLOOD PRESSURE: 139 MMHG | RESPIRATION RATE: 16 BRPM

## 2023-05-09 DIAGNOSIS — C61 PROSTATE CANCER (HCC): Primary | ICD-10-CM
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
## 2023-05-09 NOTE — PROGRESS NOTES
Follow-up - Radiation Oncology   Cecily Pintoi 1940 82 y o  male [id number]    Assessment/Plan:  Kiko Rivera is an 82 y  o  male prior patient of Dr Daniella Gama that presents today for follow up for [place] 7(3+4) prostate cancer  He completed radiation to the prostate and pelvic nodes on 12/28/2020 and has been followed by Dr Daniella Gama and subsequently transferred to me due to change in Dr Shaun Davies clinic location  He returns for f/u now 2 years and 6 months after completion  He has no residual late toxicity of radiation therapy  His PSAs remain low  We reviewed ongoing PSA and EMMA surveillance  RTC in 1 year or on an as-needed basis  He prefers to follow with Urology alone, and will notify my office if he would like to continue follow-up  Calin Oliver MD  [place] [id number]    No orders of the defined types were placed in this encounter  History of Present Illness   Interval History:  With history of [place] 7(3+4) prostate cancer  He completed radiation to the prostate on 12/28/2020  He was last seen in radiation oncology by Dr Daniella Gama on 5/4/22 and presents today for routine follow-up    PSA: 11/2/20 1 3            08/02/22 0 9            03/15/23 0 13    4/5/23  Urology  Denies urinary symptoms  EMMA reveals smooth, flat prostate without appreciable nodule  Repeat PSA in 6 months and f/u in 1 year    Today he notes no new problems with the bladder or bowels  Historical Information   Oncology History   Prostate cancer ([facility] [address] )   6/16/2020 Biopsy    A  Prostate, r apex x5 (MRI Fusion), needle biopsy:      -Prostatic adenocarcinoma  -[place] score: 3+3 = 6  -Tumor extent: tumor involves all five submitted cores  -Tumor volume: tumor involves  10%, 60% , 60,%, 70%, 80% of each core biopsy   -Perineural invasion:  Present, few foci     B  Prostate, l lat base needle biopsy:      -Small focus of adenocarcinoma of the prostate    - [place] grade 3+3 = 6   -Tumor extent: single focus in one submitted core  -Tumor volume: tumor involves approximately 5% of biopsy volume   -Perineural invasion: not identified     C  Prostate, l lat mid needle biopsy:      -Prostatic adenocarcinoma  - [place] grade 3+3=6   -Tumor extent: single focus in one submitted core  -Tumor volume: tumor involves approximately 15% of biopsy volume   -Perineural invasion: not identified     D  Prostate, l lat apex needle biopsy:    -Benign prostatic tissue     E  Prostate, l base needle biopsy:      Prostatic adenocarcinoma  - [place] grade 3+ 4 = 7   -Tumor extent: single focus in one submitted core  -Tumor volume: tumor  involves approximately 20% of biopsy volume   -Perineural invasion: Present     F  Prostate, l mid needle biopsy:      Prostatic adenocarcinoma  - [place] grade 3+ 3=6   -Tumor extent: single focus in one submitted core  -Tumor volume: tumor discontinuously involves 50% of biopsy volume   -Perineural invasion: not identified     G  Prostate, l apex needle biopsy:    -Benign prostatic tissue     H  Prostate, r lat base needle biopsy:      Prostatic adenocarcinoma  - [place] grade 3+ 4 = 7   -Tumor extent: single focus in one submitted core  -Tumor volume: tumor involves approximately 20% of biopsy volume   -Perineural invasion: not identified     I  Prostate, r lat mid needle biopsy:    -Benign prostatic tissue     J  Prostate, r lat apex needle biopsy:    -Benign prostatic tissue     K  Prostate, r base needle biopsy:      Prostatic adenocarcinoma  - [place] grade 3+ 3=6   -Tumor extent: single focus in one submitted core  -Tumor volume: tumor discontinuously involves 50% of biopsy volume   -Perineural invasion: not identified     L  Prostate, r mid needle biopsy:    -Benign prostatic tissue     M   Prostate, r apex needle biopsy:    -Benign prostatic tissue with mild acute & chronic inflammation        8/3/2020 Initial Diagnosis    Prostate cancer ([facility] [address] ) 10/22/2020 - 2020 Radiation    Treatments:  Course: C1    Plan ID Energy Fractions Dose per Fraction (cGy) Dose Correction (cGy) Total Dose Delivered (cGy) Elapsed Days   CD Prost_SV 10X 19 / 19 180 0 3,420 33   Whole Pelvis 10X 25 / 25 180 0 4,500 33      Treatment dates:  10/22/2020 - 2020         Past Medical History:   Diagnosis Date   • BPH with obstruction/lower urinary tract symptoms    • Elevated PSA    • Hypercholesteremia    • Liver hemangioma 2019   • Obesity (BMI 30-39 9) 2019   • Open-angle glaucoma 2023   • Prostate cancer ([address] ) 2020    Treated with radiation     Past Surgical History:   Procedure Laterality Date   • CATARACT EXTRACTION Right    • GALLBLADDER SURGERY  12/10/2015   • PROSTATE BIOPSY       Social History   Social History     Substance and Sexual Activity   Alcohol Use Yes    Comment: occasionally     Social History     Substance and Sexual Activity   Drug Use No     Social History     Tobacco Use   Smoking Status Never   Smokeless Tobacco Never       Meds/Allergies     Current Outpatient Medications:   •  bimatoprost (LUMIGAN) 0 03 % ophthalmic drops, Administer 1 drop to the right eye daily at bedtime, Disp: , Rfl:   •  COMBIGAN 0 2-0 5 %, INSTILL 1 DROP INTO RIGHT EYE TWICE A DAY, Disp: , Rfl: 6  •  Cyanocobalamin 1000 MCG CAPS, Take 1,000 mcg by mouth daily, Disp: , Rfl:   •  rosuvastatin (CRESTOR) 10 MG tablet, TAKE 1 TABLET BY MOUTH EVERY DAY, Disp: 90 tablet, Rfl: 3  No Known Allergies    Review of Systems:  Refer to nursing note    OBJECTIVE:   /75   Pulse 62   Temp (!) 97 °F (36 1 °C) (Temporal)   Resp 16   Wt 95 2 kg (209 lb 14 1 oz)   SpO2 99%   BMI 30 11 kg/m²   Pain Assessment:  0  Performance Status: ECO - Asymptomatic    Physical Exam:   General Appearance:  Alert, cooperative, no distress, appears stated age  Lungs: Respirations unlabored, no cyanosis, able to speak in complete sentences without "dyspnea  EMMA: Deferred  Extremities: No cyanosis or edema  Skin: No generalized rash or dermatitis  Neurologic: ANOx3, speech and cognition intact  Portions of the record may have been created with voice recognition software  Occasional wrong word or \"sound a like\" substitutions may have occurred due to the inherent limitations of voice recognition software  Read the chart carefully and recognize, using context, where substitutions have occurred    "

## 2023-05-26 DIAGNOSIS — E78.2 MIXED HYPERLIPIDEMIA: ICD-10-CM

## 2023-05-26 RX ORDER — ROSUVASTATIN CALCIUM 10 MG/1
10 TABLET, COATED ORAL DAILY
Qty: 90 TABLET | Refills: 3 | Status: SHIPPED | OUTPATIENT
Start: 2023-05-26

## 2023-08-04 ENCOUNTER — RA CDI HCC (OUTPATIENT)
Dept: OTHER | Facility: HOSPITAL | Age: 83
End: 2023-08-04

## 2023-08-04 NOTE — PROGRESS NOTES
[address] [facility] coding opportunities       Chart reviewed, no opportunity found: CHART REVIEWED, NO OPPORTUNITY FOUND        Patients Insurance        Commercial Insurance: [employer]

## 2023-08-14 ENCOUNTER — OFFICE VISIT (OUTPATIENT)
Dept: FAMILY MEDICINE CLINIC | Facility: CLINIC | Age: 83
End: 2023-08-14
Payer: COMMERCIAL

## 2023-08-14 VITALS
BODY MASS INDEX: 29.6 KG/M2 | WEIGHT: 206.8 LBS | DIASTOLIC BLOOD PRESSURE: 68 MMHG | HEART RATE: 68 BPM | TEMPERATURE: 97.3 F | OXYGEN SATURATION: 99 % | SYSTOLIC BLOOD PRESSURE: 130 MMHG | HEIGHT: 70 IN

## 2023-08-14 DIAGNOSIS — E78.2 MIXED HYPERLIPIDEMIA: ICD-10-CM

## 2023-08-14 DIAGNOSIS — Z00.00 MEDICARE ANNUAL WELLNESS VISIT, SUBSEQUENT: Primary | ICD-10-CM

## 2023-08-14 DIAGNOSIS — L30.9 HAND ECZEMA: ICD-10-CM

## 2023-08-14 DIAGNOSIS — C61 PROSTATE CANCER (HCC): ICD-10-CM

## 2023-08-14 DIAGNOSIS — E66.9 OBESITY (BMI 30-39.9): ICD-10-CM

## 2023-08-14 PROCEDURE — G0439 PPPS, SUBSEQ VISIT: HCPCS | Performed by: FAMILY MEDICINE

## 2023-08-14 PROCEDURE — 99214 OFFICE O/P EST MOD 30 MIN: CPT | Performed by: FAMILY MEDICINE

## 2023-08-14 RX ORDER — CLOBETASOL PROPIONATE 0.5 MG/G
CREAM TOPICAL
COMMUNITY
Start: 2023-07-20
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
## 2023-08-14 NOTE — PATIENT INSTRUCTIONS
Medicare wellness exam is completed. Overall, appears to be in excellent health. Lipid profile was done in January and was excellent. Appears to have some hand eczema on the right hand. Suggest decrease handwashing. Use  instead. Clobetasol could be used twice daily. Could also use moisturizing lotion after washing. The spots on the back appear different and should resolve shortly. Recheck in 6 months. Medicare Preventive Visit Patient Instructions  Thank you for completing your Welcome to Medicare Visit or Medicare Annual Wellness Visit today. Your next wellness visit will be due in one year (8/14/2024). The screening/preventive services that you may require over the next 5-10 years are detailed below. Some tests may not apply to you based off risk factors and/or age. Screening tests ordered at today's visit but not completed yet may show as past due. Also, please note that scanned in results may not display below. Preventive Screenings:  Service Recommendations Previous Testing/Comments   Colorectal Cancer Screening  Colonoscopy    Fecal Occult Blood Test (FOBT)/Fecal Immunochemical Test (FIT)  Fecal DNA/Cologuard Test  Flexible Sigmoidoscopy Age: 43-73 years old   Colonoscopy: every 10 years (May be performed more frequently if at higher risk)  OR  FOBT/FIT: every 1 year  OR  Cologuard: every 3 years  OR  Sigmoidoscopy: every 5 years  Screening may be recommended earlier than age 39 if at higher risk for colorectal cancer. Also, an individualized decision between you and your healthcare provider will decide whether screening between the ages of 77-80 would be appropriate.  Colonoscopy: Not on file  FOBT/FIT: Not on file  Cologuard: Not on file  Sigmoidoscopy: Not on file          Prostate Cancer Screening Individualized decision between patient and health care provider in men between ages of 53-66   Medicare will cover every 12 months beginning on the day after your 50th birthday PSA: 0.13 ng/mL           Hepatitis C Screening Once for adults born between 1945 and 1965  More frequently in patients at high risk for Hepatitis C Hep C Antibody: Not on file        Diabetes Screening 1-2 times per year if you're at risk for diabetes or have pre-diabetes Fasting glucose: No results in last 5 years (No results in last 5 years)  A1C: 5.4 % of total Hgb (8/2/2022)      Cholesterol Screening Once every 5 years if you don't have a lipid disorder. May order more often based on risk factors. Lipid panel: 01/10/2023         Other Preventive Screenings Covered by Medicare:  Abdominal Aortic Aneurysm (AAA) Screening: covered once if your at risk. You're considered to be at risk if you have a family history of AAA or a male between the age of 70-76 who smoking at least 100 cigarettes in your lifetime. Lung Cancer Screening: covers low dose CT scan once per year if you meet all of the following conditions: (1) Age 48-67; (2) No signs or symptoms of lung cancer; (3) Current smoker or have quit smoking within the last 15 years; (4) You have a tobacco smoking history of at least 20 pack years (packs per day x number of years you smoked); (5) You get a written order from a healthcare provider. Glaucoma Screening: covered annually if you're considered high risk: (1) You have diabetes OR (2) Family history of glaucoma OR (3)  aged 48 and older OR (3)  American aged 72 and older  Osteoporosis Screening: covered every 2 years if you meet one of the following conditions: (1) Have a vertebral abnormality; (2) On glucocorticoid therapy for more than 3 months; (3) Have primary hyperparathyroidism; (4) On osteoporosis medications and need to assess response to drug therapy. HIV Screening: covered annually if you're between the age of 14-79. Also covered annually if you are younger than 13 and older than 72 with risk factors for HIV infection.  For pregnant patients, it is covered up to 3 times per pregnancy. Immunizations:  Immunization Recommendations   Influenza Vaccine Annual influenza vaccination during flu season is recommended for all persons aged >= 6 months who do not have contraindications   Pneumococcal Vaccine   * Pneumococcal conjugate vaccine = PCV13 (Prevnar 13), PCV15 (Vaxneuvance), PCV20 (Prevnar 20)  * Pneumococcal polysaccharide vaccine = PPSV23 (Pneumovax) Adults 20-63 years old: 1-3 doses may be recommended based on certain risk factors  Adults 72 years old: 1-2 doses may be recommended based off what pneumonia vaccine you previously received   Hepatitis B Vaccine 3 dose series if at intermediate or high risk (ex: diabetes, end stage renal disease, liver disease)   Tetanus (Td) Vaccine - COST NOT COVERED BY MEDICARE PART B Following completion of primary series, a booster dose should be given every 10 years to maintain immunity against tetanus. Td may also be given as tetanus wound prophylaxis. Tdap Vaccine - COST NOT COVERED BY MEDICARE PART B Recommended at least once for all adults. For pregnant patients, recommended with each pregnancy. Shingles Vaccine (Shingrix) - COST NOT COVERED BY MEDICARE PART B  2 shot series recommended in those aged 48 and above     Health Maintenance Due:  There are no preventive care reminders to display for this patient. Immunizations Due:      Topic Date Due    Pneumococcal Vaccine: 65+ Years (2 - PPSV23 if available, else PCV20) 11/27/2020    COVID-19 Vaccine (6 - Moderna series) 03/07/2023    Influenza Vaccine (1) 09/01/2023     Advance Directives   What are advance directives? Advance directives are legal documents that state your wishes and plans for medical care. These plans are made ahead of time in case you lose your ability to make decisions for yourself. Advance directives can apply to any medical decision, such as the treatments you want, and if you want to donate organs. What are the types of advance directives?   There are many types of advance directives, and each state has rules about how to use them. You may choose a combination of any of the following:  Living will: This is a written record of the treatment you want. You can also choose which treatments you do not want, which to limit, and which to stop at a certain time. This includes surgery, medicine, IV fluid, and tube feedings. Durable power of  for healthcare [facility]): This is a written record that states who you want to make healthcare choices for you when you are unable to make them for yourself. This person, called a proxy, is usually a family member or a friend. You may choose more than 1 proxy. Do not resuscitate (DNR) order:  A DNR order is used in case your heart stops beating or you stop breathing. It is a request not to have certain forms of treatment, such as CPR. A DNR order may be included in other types of advance directives. Medical directive: This covers the care that you want if you are in a coma, near death, or unable to make decisions for yourself. You can list the treatments you want for each condition. Treatment may include pain medicine, surgery, blood transfusions, dialysis, IV or tube feedings, and a ventilator (breathing machine). Values history: This document has questions about your views, beliefs, and how you feel and think about life. This information can help others choose the care that you would choose. Why are advance directives important? An advance directive helps you control your care. Although spoken wishes may be used, it is better to have your wishes written down. Spoken wishes can be misunderstood, or not followed. Treatments may be given even if you do not want them. An advance directive may make it easier for your family to make difficult choices about your care.    Weight Management   Why it is important to manage your weight:  Being overweight increases your risk of health conditions such as heart disease, high blood pressure, type 2 diabetes, and certain types of cancer. It can also increase your risk for osteoarthritis, sleep apnea, and other respiratory problems. Aim for a slow, steady weight loss. Even a small amount of weight loss can lower your risk of health problems. How to lose weight safely:  A safe and healthy way to lose weight is to eat fewer calories and get regular exercise. You can lose up about 1 pound a week by decreasing the number of calories you eat by 500 calories each day. Healthy meal plan for weight management:  A healthy meal plan includes a variety of foods, contains fewer calories, and helps you stay healthy. A healthy meal plan includes the following:  Eat whole-grain foods more often. A healthy meal plan should contain fiber. Fiber is the part of grains, fruits, and vegetables that is not broken down by your body. Whole-grain foods are healthy and provide extra fiber in your diet. Some examples of whole-grain foods are whole-wheat breads and pastas, oatmeal, brown rice, and bulgur. Eat a variety of vegetables every day. Include dark, leafy greens such as spinach, kale, morro greens, and mustard greens. Eat yellow and orange vegetables such as carrots, sweet potatoes, and winter squash. Eat a variety of fruits every day. Choose fresh or canned fruit (canned in its own juice or light syrup) instead of juice. Fruit juice has very little or no fiber. Eat low-fat dairy foods. Drink fat-free (skim) milk or 1% milk. Eat fat-free yogurt and low-fat cottage cheese. Try low-fat cheeses such as mozzarella and other reduced-fat cheeses. Choose meat and other protein foods that are low in fat. Choose beans or other legumes such as split peas or lentils. Choose fish, skinless poultry (chicken or turkey), or lean cuts of red meat (beef or pork). Before you cook meat or poultry, cut off any visible fat. Use less fat and oil. Try baking foods instead of frying them.  Add less fat, such as margarine, sour cream, regular salad dressing and mayonnaise to foods. Eat fewer high-fat foods. Some examples of high-fat foods include french fries, doughnuts, ice cream, and cakes. Eat fewer sweets. Limit foods and drinks that are high in sugar. This includes candy, cookies, regular soda, and sweetened drinks. Exercise:  Exercise at least 30 minutes per day on most days of the week. Some examples of exercise include walking, biking, dancing, and swimming. You can also fit in more physical activity by taking the stairs instead of the elevator or parking farther away from stores. Ask your healthcare provider about the best exercise plan for you. © Copyright [employer] 2018 Information is for End User's use only and may not be sold, redistributed or otherwise used for commercial purposes.  All illustrations and images included in CareNotes® are the copyrighted property of A.D.A.M., Inc. or  [address]

## 2023-10-18 LAB — PSA SERPL-MCNC: 0.2 NG/ML

## 2024-02-12 ENCOUNTER — RA CDI HCC (OUTPATIENT)
Dept: OTHER | Facility: HOSPITAL | Age: 84
End: 2024-02-12

## 2024-02-12 PROBLEM — Z85.46 HISTORY OF PROSTATE CANCER: Status: ACTIVE | Noted: 2024-02-12

## 2024-02-12 PROBLEM — E66.9 OBESITY (BMI 30-39.9): Status: RESOLVED | Noted: 2019-11-29 | Resolved: 2024-02-12

## 2024-02-12 PROBLEM — Z85.46 HISTORY OF PROSTATE CANCER: Status: ACTIVE | Noted: 2020-08-03

## 2024-02-12 NOTE — PROGRESS NOTES
HCC coding opportunities       Chart reviewed, no opportunity found: CHART REVIEWED, NO OPPORTUNITY FOUND      This is a reminder to address (resolve/update/assess) ALL HCC (risk adjustment) codes as found on active problem list for 2024 as patient scores reset to zero TERRY.  Also, just a reminder to please review and assess all other chronic conditions for 2024  Patients Insurance     Medicare Insurance: [employer]

## 2024-02-19 ENCOUNTER — OFFICE VISIT (OUTPATIENT)
Dept: FAMILY MEDICINE CLINIC | Facility: CLINIC | Age: 84
End: 2024-02-19
Payer: COMMERCIAL

## 2024-02-19 VITALS
HEIGHT: 70 IN | DIASTOLIC BLOOD PRESSURE: 70 MMHG | TEMPERATURE: 97.2 F | SYSTOLIC BLOOD PRESSURE: 128 MMHG | WEIGHT: 211.2 LBS | HEART RATE: 64 BPM | BODY MASS INDEX: 30.24 KG/M2 | OXYGEN SATURATION: 96 %

## 2024-02-19 DIAGNOSIS — L30.9 HAND ECZEMA: ICD-10-CM

## 2024-02-19 DIAGNOSIS — R35.1 BENIGN PROSTATIC HYPERPLASIA WITH NOCTURIA: ICD-10-CM

## 2024-02-19 DIAGNOSIS — E78.2 MIXED HYPERLIPIDEMIA: Primary | ICD-10-CM

## 2024-02-19 DIAGNOSIS — Z85.46 HISTORY OF PROSTATE CANCER: ICD-10-CM

## 2024-02-19 DIAGNOSIS — N40.1 BENIGN PROSTATIC HYPERPLASIA WITH NOCTURIA: ICD-10-CM

## 2024-02-19 PROBLEM — C61 PROSTATE CANCER (HCC): Status: ACTIVE | Noted: 2024-02-19

## 2024-02-19 PROBLEM — C61 PROSTATE CANCER (HCC): Status: RESOLVED | Noted: 2024-02-19 | Resolved: 2024-02-19

## 2024-02-19 PROCEDURE — 99214 OFFICE O/P EST MOD 30 MIN: CPT | Performed by: FAMILY MEDICINE

## 2024-02-19 RX ORDER — ROSUVASTATIN CALCIUM 10 MG/1
10 TABLET, COATED ORAL DAILY
Qty: 90 TABLET | Refills: 3 | Status: SHIPPED | OUTPATIENT
Start: 2024-02-19
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
## 2024-02-19 NOTE — PATIENT INSTRUCTIONS
Looks great.  PSA is quite low.  Lipid profile is excellent.  Continue rosuvastatin.  Not necessary to check lipid profile this year as his last 5 readings have been excellent.  Recheck in 6 months.
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
## 2024-04-29 ENCOUNTER — OFFICE VISIT (OUTPATIENT)
Dept: UROLOGY | Facility: MEDICAL CENTER | Age: 84
End: 2024-04-29
Payer: COMMERCIAL

## 2024-04-29 VITALS
SYSTOLIC BLOOD PRESSURE: 110 MMHG | WEIGHT: 209 LBS | HEIGHT: 70 IN | OXYGEN SATURATION: 98 % | BODY MASS INDEX: 29.92 KG/M2 | HEART RATE: 70 BPM | DIASTOLIC BLOOD PRESSURE: 70 MMHG

## 2024-04-29 DIAGNOSIS — C61 PROSTATE CANCER (HCC): Primary | ICD-10-CM

## 2024-04-29 DIAGNOSIS — N40.0 BENIGN PROSTATIC HYPERPLASIA WITHOUT LOWER URINARY TRACT SYMPTOMS: ICD-10-CM

## 2024-04-29 LAB
BACTERIA UR QL AUTO: ABNORMAL /HPF
BILIRUB UR QL STRIP: NEGATIVE
CLARITY UR: CLEAR
COLOR UR: ABNORMAL
GLUCOSE UR STRIP-MCNC: NEGATIVE MG/DL
HGB UR QL STRIP.AUTO: NEGATIVE
KETONES UR STRIP-MCNC: NEGATIVE MG/DL
LEUKOCYTE ESTERASE UR QL STRIP: NEGATIVE
MUCOUS THREADS UR QL AUTO: ABNORMAL
NITRITE UR QL STRIP: NEGATIVE
NON-SQ EPI CELLS URNS QL MICRO: ABNORMAL /HPF
PH UR STRIP.AUTO: 6 [PH]
PROT UR STRIP-MCNC: ABNORMAL MG/DL
RBC #/AREA URNS AUTO: ABNORMAL /HPF
SL AMB  POCT GLUCOSE, UA: ABNORMAL
SL AMB LEUKOCYTE ESTERASE,UA: ABNORMAL
SL AMB POCT BILIRUBIN,UA: ABNORMAL
SL AMB POCT BLOOD,UA: ABNORMAL
SL AMB POCT CLARITY,UA: CLEAR
SL AMB POCT COLOR,UA: YELLOW
SL AMB POCT KETONES,UA: ABNORMAL
SL AMB POCT NITRITE,UA: ABNORMAL
SL AMB POCT PH,UA: 6.5
SL AMB POCT SPECIFIC GRAVITY,UA: 1.01
SL AMB POCT URINE PROTEIN: ABNORMAL
SL AMB POCT UROBILINOGEN: 0.2
SP GR UR STRIP.AUTO: 1.01 (ref 1–1.03)
UROBILINOGEN UR STRIP-ACNC: <2 MG/DL
WBC #/AREA URNS AUTO: ABNORMAL /HPF

## 2024-04-29 PROCEDURE — 81001 URINALYSIS AUTO W/SCOPE: CPT

## 2024-04-29 PROCEDURE — 81003 URINALYSIS AUTO W/O SCOPE: CPT

## 2024-04-29 PROCEDURE — 99213 OFFICE O/P EST LOW 20 MIN: CPT

## 2024-08-01 LAB — PSA SERPL-MCNC: 0.43 NG/ML

## 2024-08-12 ENCOUNTER — RA CDI HCC (OUTPATIENT)
Dept: OTHER | Facility: HOSPITAL | Age: 84
End: 2024-08-12

## 2024-08-19 ENCOUNTER — OFFICE VISIT (OUTPATIENT)
Dept: FAMILY MEDICINE CLINIC | Facility: CLINIC | Age: 84
End: 2024-08-19
Payer: COMMERCIAL

## 2024-08-19 VITALS
BODY MASS INDEX: 29.81 KG/M2 | OXYGEN SATURATION: 99 % | WEIGHT: 208.2 LBS | SYSTOLIC BLOOD PRESSURE: 128 MMHG | HEIGHT: 70 IN | HEART RATE: 68 BPM | DIASTOLIC BLOOD PRESSURE: 70 MMHG

## 2024-08-19 DIAGNOSIS — E78.2 MIXED HYPERLIPIDEMIA: ICD-10-CM

## 2024-08-19 DIAGNOSIS — Z00.00 MEDICARE ANNUAL WELLNESS VISIT, SUBSEQUENT: Primary | ICD-10-CM

## 2024-08-19 DIAGNOSIS — Z85.46 HISTORY OF PROSTATE CANCER: ICD-10-CM

## 2024-08-19 PROCEDURE — 99213 OFFICE O/P EST LOW 20 MIN: CPT | Performed by: FAMILY MEDICINE

## 2024-08-19 PROCEDURE — G0439 PPPS, SUBSEQ VISIT: HCPCS | Performed by: FAMILY MEDICINE

## 2024-08-19 RX ORDER — BIMATOPROST 0.1 MG/ML
1 SOLUTION/ DROPS OPHTHALMIC
COMMUNITY
Start: 2024-06-18

## 2024-08-19 NOTE — PROGRESS NOTES
Ambulatory Visit  Name: Guille Rivera      : 1940      MRN: [id number]  Encounter Provider: Melecio Padgett MD  Encounter Date: 2024   Encounter department: [facility]    Assessment & Plan   1. Medicare annual wellness visit, subsequent  2. Mixed hyperlipidemia  -     Lipid panel; Future; Expected date: 2024  3. History of prostate cancer       Preventive health issues were discussed with patient, and age appropriate screening tests were ordered as noted in patient's After Visit Summary. Personalized health advice and appropriate referrals for health education or preventive services given if needed, as noted in patient's After Visit Summary.    History of Present Illness     HPI   Patient Care Team:  Melecio Padgett MD as PCP - General (Family Medicine)  Mikel Castrejon MD as PCP - PCP-[facility]  Luis Hooper MD (Urology)  Tutu Wilhelm MD (Radiation Oncology)    Review of Systems  Medical History Reviewed by provider this encounter:  Tobacco  Allergies  Meds  Problems  Med Hx  Surg Hx  Fam Hx       Annual Wellness Visit Questionnaire   Guille is here for his Subsequent Wellness visit. Last Medicare Wellness visit information reviewed, patient interviewed and updates made to the record today.      Health Risk Assessment:   Patient rates overall health as very good. Patient feels that their physical health rating is same. Patient is very satisfied with their life. Eyesight was rated as same. Hearing was rated as same. Patient feels that their emotional and mental health rating is much better. Patients states they are never, rarely angry. Patient states they are never, rarely unusually tired/fatigued. Pain experienced in the last 7 days has been none. Patient states that he has experienced no weight loss or gain in last 6 months.     Depression Screening:   PHQ-2 Score: 0  PHQ-9 Score: 0      Fall Risk Screening:   In the past year, patient has  experienced: no history of falling in past year      Home Safety:  Patient does not have trouble with stairs inside or outside of their home. Patient has working smoke alarms and has working carbon monoxide detector. Home safety hazards include: none.     Nutrition:   Current diet is Regular.     Medications:   Patient is not currently taking any over-the-counter supplements. Patient is able to manage medications.     Activities of Daily Living (ADLs)/Instrumental Activities of Daily Living (IADLs):   Walk and transfer into and out of bed and chair?: Yes  Dress and groom yourself?: Yes    Bathe or shower yourself?: Yes    Feed yourself? Yes  Do your laundry/housekeeping?: Yes  Manage your money, pay your bills and track your expenses?: Yes  Make your own meals?: Yes    Do your own shopping?: Yes    Advance Care Planning:   Living will: Yes    Durable POA for healthcare: No    Advanced directive: Yes      PREVENTIVE SCREENINGS      Cardiovascular Screening:    General: Screening Not Indicated and History Lipid Disorder      Prostate Cancer Screening:    General: History Prostate Cancer and Screening Not Indicated      Lung Cancer Screening:     General: Screening Not Indicated    Screening, Brief Intervention, and Referral to Treatment (SBIRT)    Screening  Typical number of drinks in a day: 1  Typical number of drinks in a week: 2  Interpretation: Low risk drinking behavior.    Single Item Drug Screening:  How often have you used an illegal drug (including marijuana) or a prescription medication for non-medical reasons in the past year? never    Single Item Drug Screen Score: 0  Interpretation: Negative screen for possible drug use disorder    Social Determinants of Health     Financial Resource Strain: Low Risk  (8/14/2023)    Overall Financial Resource Strain (CARDIA)     Difficulty of Paying Living Expenses: Not hard at all   Transportation Needs: No Transportation Needs (8/14/2023)    PRAPARE - Transportation     " Lack of Transportation (Medical): No     Lack of Transportation (Non-Medical): No     No results found.    Objective     /70 (BP Location: Left arm, Patient Position: Sitting, Cuff Size: Large)   Pulse 68   Ht 5' 10\" (1.778 m)   Wt 94.4 kg (208 lb 3.2 oz)   SpO2 99%   BMI 29.87 kg/m²     Physical Exam      "

## 2024-08-19 NOTE — PATIENT INSTRUCTIONS
Medicare wellness exam is completed.  Overall, looks great.  Continue atorvastatin.  Check lipid profile.  PSA was normal at 0.43.  Recommend flu shot and COVID booster in October.  Recheck in 6 months.  Medicare Preventive Visit Patient Instructions  Thank you for completing your Welcome to Medicare Visit or Medicare Annual Wellness Visit today. Your next wellness visit will be due in one year (8/20/2025).  The screening/preventive services that you may require over the next 5-10 years are detailed below. Some tests may not apply to you based off risk factors and/or age. Screening tests ordered at today's visit but not completed yet may show as past due. Also, please note that scanned in results may not display below.  Preventive Screenings:  Service Recommendations Previous Testing/Comments   Colorectal Cancer Screening  Colonoscopy    Fecal Occult Blood Test (FOBT)/Fecal Immunochemical Test (FIT)  Fecal DNA/Cologuard Test  Flexible Sigmoidoscopy Age: 45-75 years old   Colonoscopy: every 10 years (May be performed more frequently if at higher risk)  OR  FOBT/FIT: every 1 year  OR  Cologuard: every 3 years  OR  Sigmoidoscopy: every 5 years  Screening may be recommended earlier than age 45 if at higher risk for colorectal cancer. Also, an individualized decision between you and your healthcare provider will decide whether screening between the ages of 76-85 would be appropriate. Colonoscopy: Not on file  FOBT/FIT: Not on file  Cologuard: Not on file  Sigmoidoscopy: Not on file          Prostate Cancer Screening Individualized decision between patient and health care provider in men between ages of 55-69   Medicare will cover every 12 months beginning on the day after your 50th birthday PSA: 0.43 ng/mL           Hepatitis C Screening Once for adults born between 1945 and 1965  More frequently in patients at high risk for Hepatitis C Hep C Antibody: Not on file        Diabetes Screening 1-2 times per year if you're at  risk for diabetes or have pre-diabetes Fasting glucose: No results in last 5 years (No results in last 5 years)  A1C: 5.4 % of total Hgb (8/2/2022)      Cholesterol Screening Once every 5 years if you don't have a lipid disorder. May order more often based on risk factors. Lipid panel: 01/10/2023         Other Preventive Screenings Covered by Medicare:  Abdominal Aortic Aneurysm (AAA) Screening: covered once if your at risk. You're considered to be at risk if you have a family history of AAA or a male between the age of 65-75 who smoking at least 100 cigarettes in your lifetime.  Lung Cancer Screening: covers low dose CT scan once per year if you meet all of the following conditions: (1) Age 55-77; (2) No signs or symptoms of lung cancer; (3) Current smoker or have quit smoking within the last 15 years; (4) You have a tobacco smoking history of at least 20 pack years (packs per day x number of years you smoked); (5) You get a written order from a healthcare provider.  Glaucoma Screening: covered annually if you're considered high risk: (1) You have diabetes OR (2) Family history of glaucoma OR (3)  aged 50 and older OR (4)  American aged 65 and older  Osteoporosis Screening: covered every 2 years if you meet one of the following conditions: (1) Have a vertebral abnormality; (2) On glucocorticoid therapy for more than 3 months; (3) Have primary hyperparathyroidism; (4) On osteoporosis medications and need to assess response to drug therapy.  HIV Screening: covered annually if you're between the age of 15-65. Also covered annually if you are younger than 15 and older than 65 with risk factors for HIV infection. For pregnant patients, it is covered up to 3 times per pregnancy.    Immunizations:  Immunization Recommendations   Influenza Vaccine Annual influenza vaccination during flu season is recommended for all persons aged >= 6 months who do not have contraindications   Pneumococcal Vaccine    * Pneumococcal conjugate vaccine = PCV13 (Prevnar 13), PCV15 (Vaxneuvance), PCV20 (Prevnar 20)  * Pneumococcal polysaccharide vaccine = PPSV23 (Pneumovax) Adults 19-63 yo with certain risk factors or if 65+ yo  If never received any pneumonia vaccine: recommend Prevnar 20 (PCV20)  Give PCV20 if previously received 1 dose of PCV13 or PPSV23   Hepatitis B Vaccine 3 dose series if at intermediate or high risk (ex: diabetes, end stage renal disease, liver disease)   Respiratory syncytial virus (RSV) Vaccine - COVERED BY MEDICARE PART D  * RSVPreF3 (Arexvy) CDC recommends that adults 60 years of age and older may receive a single dose of RSV vaccine using shared clinical decision-making (SCDM)   Tetanus (Td) Vaccine - COST NOT COVERED BY MEDICARE PART B Following completion of primary series, a booster dose should be given every 10 years to maintain immunity against tetanus. Td may also be given as tetanus wound prophylaxis.   Tdap Vaccine - COST NOT COVERED BY MEDICARE PART B Recommended at least once for all adults. For pregnant patients, recommended with each pregnancy.   Shingles Vaccine (Shingrix) - COST NOT COVERED BY MEDICARE PART B  2 shot series recommended in those 19 years and older who have or will have weakened immune systems or those 50 years and older     Health Maintenance Due:  There are no preventive care reminders to display for this patient.  Immunizations Due:      Topic Date Due   • Pneumococcal Vaccine: 65+ Years (2 of 2 - PPSV23 or PCV20) 11/27/2020   • Influenza Vaccine (1) 09/01/2024     Advance Directives   What are advance directives?  Advance directives are legal documents that state your wishes and plans for medical care. These plans are made ahead of time in case you lose your ability to make decisions for yourself. Advance directives can apply to any medical decision, such as the treatments you want, and if you want to donate organs.   What are the types of advance directives?  There are  many types of advance directives, and each state has rules about how to use them. You may choose a combination of any of the following:  Living will:  This is a written record of the treatment you want. You can also choose which treatments you do not want, which to limit, and which to stop at a certain time. This includes surgery, medicine, IV fluid, and tube feedings.   Durable power of  for healthcare (DPAHC):  This is a written record that states who you want to make healthcare choices for you when you are unable to make them for yourself. This person, called a proxy, is usually a family member or a friend. You may choose more than 1 proxy.  Do not resuscitate (DNR) order:  A DNR order is used in case your heart stops beating or you stop breathing. It is a request not to have certain forms of treatment, such as CPR. A DNR order may be included in other types of advance directives.  Medical directive:  This covers the care that you want if you are in a coma, near death, or unable to make decisions for yourself. You can list the treatments you want for each condition. Treatment may include pain medicine, surgery, blood transfusions, dialysis, IV or tube feedings, and a ventilator (breathing machine).  Values history:  This document has questions about your views, beliefs, and how you feel and think about life. This information can help others choose the care that you would choose.  Why are advance directives important?  An advance directive helps you control your care. Although spoken wishes may be used, it is better to have your wishes written down. Spoken wishes can be misunderstood, or not followed. Treatments may be given even if you do not want them. An advance directive may make it easier for your family to make difficult choices about your care.   Weight Management   Why it is important to manage your weight:  Being overweight increases your risk of health conditions such as heart disease, high blood  pressure, type 2 diabetes, and certain types of cancer. It can also increase your risk for osteoarthritis, sleep apnea, and other respiratory problems. Aim for a slow, steady weight loss. Even a small amount of weight loss can lower your risk of health problems.  How to lose weight safely:  A safe and healthy way to lose weight is to eat fewer calories and get regular exercise. You can lose up about 1 pound a week by decreasing the number of calories you eat by 500 calories each day.   Healthy meal plan for weight management:  A healthy meal plan includes a variety of foods, contains fewer calories, and helps you stay healthy. A healthy meal plan includes the following:  Eat whole-grain foods more often.  A healthy meal plan should contain fiber. Fiber is the part of grains, fruits, and vegetables that is not broken down by your body. Whole-grain foods are healthy and provide extra fiber in your diet. Some examples of whole-grain foods are whole-wheat breads and pastas, oatmeal, brown rice, and bulgur.  Eat a variety of vegetables every day.  Include dark, leafy greens such as spinach, kale, morro greens, and mustard greens. Eat yellow and orange vegetables such as carrots, sweet potatoes, and winter squash.   Eat a variety of fruits every day.  Choose fresh or canned fruit (canned in its own juice or light syrup) instead of juice. Fruit juice has very little or no fiber.  Eat low-fat dairy foods.  Drink fat-free (skim) milk or 1% milk. Eat fat-free yogurt and low-fat cottage cheese. Try low-fat cheeses such as mozzarella and other reduced-fat cheeses.  Choose meat and other protein foods that are low in fat.  Choose beans or other legumes such as split peas or lentils. Choose fish, skinless poultry (chicken or turkey), or lean cuts of red meat (beef or pork). Before you cook meat or poultry, cut off any visible fat.   Use less fat and oil.  Try baking foods instead of frying them. Add less fat, such as margarine,  sour cream, regular salad dressing and mayonnaise to foods. Eat fewer high-fat foods. Some examples of high-fat foods include french fries, doughnuts, ice cream, and cakes.  Eat fewer sweets.  Limit foods and drinks that are high in sugar. This includes candy, cookies, regular soda, and sweetened drinks.  Exercise:  Exercise at least 30 minutes per day on most days of the week. Some examples of exercise include walking, biking, dancing, and swimming. You can also fit in more physical activity by taking the stairs instead of the elevator or parking farther away from stores. Ask your healthcare provider about the best exercise plan for you.      © Copyright [employer] 2018 Information is for End User's use only and may not be sold, redistributed or otherwise used for commercial purposes. All illustrations and images included in CareNotes® are the copyrighted property of A.D.A.M., Inc. or [employer]
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
## 2024-10-23 NOTE — TELEPHONE ENCOUNTER
Patient managed by Mariusz Hnies at the [facility] office  He has a history of prostate cancer  Patient called with complaints of bladder pressure and pain, urinary urgency and frequency  Pt states that he has been up all night urinating and at times he only "dribbles"  Pt denies blood in his urine, fever or chills at thsi time  Wife was also on the phone stating that patient has felt like this for the past 5 days and she feels that he is not emptying his bladder completely  Offered for patient to come into the office for PVR and to assess if patient is emptying completely  Pt stated that he does not want to do that right now  He stated "I felt like this once before and I waited a few days and I got better"  Offered also for patient to proceed for urine testing at this time to rule out a UTI  Pt stated that he does not want to have this done either at this moment and will just "monitor his symptoms"  I did review ER precautions with this patient and his wife should his symptoms worsen  They did verbalize understanding of these recommendations at this time  Pt stated that he will contact the office in a few days if he is not feeling any better  Office number reviewed with patient at this time 
Wife is requesting a call back  Patient is having a hard time urinating and is experiencing pressure 
165.1

## 2025-02-07 ENCOUNTER — RESULTS FOLLOW-UP (OUTPATIENT)
Dept: FAMILY MEDICINE CLINIC | Facility: CLINIC | Age: 85
End: 2025-02-07

## 2025-02-07 LAB
CHOLEST SERPL-MCNC: 154 MG/DL
CHOLEST/HDLC SERPL: 3.5 (CALC)
HDLC SERPL-MCNC: 44 MG/DL
LDLC SERPL CALC-MCNC: 86 MG/DL (CALC)
NONHDLC SERPL-MCNC: 110 MG/DL (CALC)
PSA SERPL-MCNC: 0.22 NG/ML
TRIGL SERPL-MCNC: 143 MG/DL

## 2025-02-10 NOTE — TELEPHONE ENCOUNTER
----- Message from Melecio Padgett MD sent at 2/7/2025  2:33 PM EST -----  Advise patient:  Cholesterol is excellent.  PSA is normal.

## 2025-02-24 ENCOUNTER — RA CDI HCC (OUTPATIENT)
Dept: OTHER | Facility: HOSPITAL | Age: 85
End: 2025-02-24

## 2025-02-24 NOTE — PROGRESS NOTES
HCC coding opportunities       Chart reviewed, no opportunity found: CHART REVIEWED, NO OPPORTUNITY FOUND      This is a reminder to address (resolve/update/assess) ALL HCC (risk adjustment) codes as found on active problem list for 2025 as patient scores reset to zero TERRY.  Patients Insurance     Medicare Insurance: [employer]vantage

## 2025-03-25 DIAGNOSIS — E78.2 MIXED HYPERLIPIDEMIA: ICD-10-CM

## 2025-03-26 RX ORDER — ROSUVASTATIN CALCIUM 10 MG/1
10 TABLET, COATED ORAL DAILY
Qty: 90 TABLET | Refills: 0 | Status: SHIPPED | OUTPATIENT
Start: 2025-03-26

## 2025-04-21 ENCOUNTER — OFFICE VISIT (OUTPATIENT)
Dept: FAMILY MEDICINE CLINIC | Facility: CLINIC | Age: 85
End: 2025-04-21
Payer: COMMERCIAL

## 2025-04-21 VITALS
DIASTOLIC BLOOD PRESSURE: 62 MMHG | OXYGEN SATURATION: 97 % | HEART RATE: 54 BPM | SYSTOLIC BLOOD PRESSURE: 100 MMHG | HEIGHT: 70 IN | BODY MASS INDEX: 30.35 KG/M2 | WEIGHT: 212 LBS | TEMPERATURE: 96.5 F

## 2025-04-21 DIAGNOSIS — E78.2 MIXED HYPERLIPIDEMIA: Primary | ICD-10-CM

## 2025-04-21 DIAGNOSIS — N40.1 BENIGN PROSTATIC HYPERPLASIA WITH NOCTURIA: ICD-10-CM

## 2025-04-21 DIAGNOSIS — R35.1 BENIGN PROSTATIC HYPERPLASIA WITH NOCTURIA: ICD-10-CM

## 2025-04-21 DIAGNOSIS — Z85.46 HISTORY OF PROSTATE CANCER: ICD-10-CM

## 2025-04-21 PROCEDURE — 99213 OFFICE O/P EST LOW 20 MIN: CPT | Performed by: FAMILY MEDICINE

## 2025-04-21 PROCEDURE — G2211 COMPLEX E/M VISIT ADD ON: HCPCS | Performed by: FAMILY MEDICINE
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
## 2025-04-21 NOTE — PATIENT INSTRUCTIONS
Cholesterol is excellent.  PSA normal.  Continues to do well.  Shingles vaccine is recommended at local drugstore.  A second COVID booster would be recommended 6 months after the 1 he had in the middle of November.  Recheck in 6 months.

## 2025-05-08 ENCOUNTER — OFFICE VISIT (OUTPATIENT)
Dept: UROLOGY | Facility: MEDICAL CENTER | Age: 85
End: 2025-05-08
Payer: COMMERCIAL

## 2025-05-08 VITALS
WEIGHT: 206 LBS | OXYGEN SATURATION: 98 % | DIASTOLIC BLOOD PRESSURE: 74 MMHG | HEART RATE: 58 BPM | SYSTOLIC BLOOD PRESSURE: 142 MMHG | HEIGHT: 70 IN | BODY MASS INDEX: 29.49 KG/M2

## 2025-05-08 DIAGNOSIS — C61 PROSTATE CANCER (HCC): ICD-10-CM

## 2025-05-08 DIAGNOSIS — R31.0 GROSS HEMATURIA: ICD-10-CM

## 2025-05-08 DIAGNOSIS — N40.0 BENIGN PROSTATIC HYPERPLASIA WITHOUT LOWER URINARY TRACT SYMPTOMS: Primary | ICD-10-CM

## 2025-05-08 LAB
SL AMB  POCT GLUCOSE, UA: ABNORMAL
SL AMB LEUKOCYTE ESTERASE,UA: ABNORMAL
SL AMB POCT BILIRUBIN,UA: ABNORMAL
SL AMB POCT BLOOD,UA: ABNORMAL
SL AMB POCT CLARITY,UA: CLEAR
SL AMB POCT COLOR,UA: YELLOW
SL AMB POCT KETONES,UA: ABNORMAL
SL AMB POCT NITRITE,UA: ABNORMAL
SL AMB POCT PH,UA: 6
SL AMB POCT SPECIFIC GRAVITY,UA: 1.02
SL AMB POCT URINE PROTEIN: ABNORMAL
SL AMB POCT UROBILINOGEN: 0.2

## 2025-05-08 PROCEDURE — 99213 OFFICE O/P EST LOW 20 MIN: CPT

## 2025-05-08 PROCEDURE — 81003 URINALYSIS AUTO W/O SCOPE: CPT

## 2025-05-08 NOTE — ASSESSMENT & PLAN NOTE
History of [place] 3+4 = 7 prostate cancer diagnosed in 2020 status post radiation therapy completed in 2020  Patient received 1 dose of Lupron prior to radiation therapy  Patient's most recent PSA was performed 2/6/2025 and found to be stable at a value of 0.22.  Refer to PSA trend below.  We discussed that we can plan to repeat his PSA once more in 6 months and again in 1 year, and the stability of his PSA.  Patient will undergo PSA testing in 6 months in 1 year with follow-up in the office in 1 year    Lab Results   Component Value Date    PSA 0.22 02/06/2025    PSA 0.43 07/31/2024    PSA 0.20 10/18/2023
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
## 2025-05-08 NOTE — ASSESSMENT & PLAN NOTE
Experienced an episode of radiation cystitis in 2021  CT renal protocol obtained at that time was unremarkable.  Urine microscopy performed 4/29/2024 did not reveal any microscopic hematuria  Patient will continue to monitor for recurrence of gross hematuria and notify our office if he experiences another episode

## 2025-06-24 DIAGNOSIS — E78.2 MIXED HYPERLIPIDEMIA: ICD-10-CM

## 2025-06-24 RX ORDER — ROSUVASTATIN CALCIUM 10 MG/1
10 TABLET, COATED ORAL DAILY
Qty: 90 TABLET | Refills: 0 | Status: SHIPPED | OUTPATIENT
Start: 2025-06-24